# Patient Record
Sex: FEMALE | Race: OTHER | HISPANIC OR LATINO | Employment: UNEMPLOYED | URBAN - METROPOLITAN AREA
[De-identification: names, ages, dates, MRNs, and addresses within clinical notes are randomized per-mention and may not be internally consistent; named-entity substitution may affect disease eponyms.]

---

## 2022-06-21 LAB
EXTERNAL ABO GROUPING: NORMAL
EXTERNAL ANTIBODY SCREEN: NORMAL
EXTERNAL CHLAMYDIA RESULT: NOT DETECTED
EXTERNAL HEPATITIS B SURFACE ANTIGEN: NORMAL
EXTERNAL HIV SCREEN: NORMAL
EXTERNAL HIV-1/2 AB-AG: NORMAL
EXTERNAL RH FACTOR: POSITIVE
EXTERNAL RUBELLA IGG QUANTITATION: NORMAL
EXTERNAL SYPHILIS RPR SCREEN: NORMAL
HCV AB SER-ACNC: NOT DETECTED
N GONORRHOEA RRNA SPEC QL PROBE: NOT DETECTED

## 2022-07-29 ENCOUNTER — TELEPHONE (OUTPATIENT)
Dept: OBGYN CLINIC | Facility: CLINIC | Age: 22
End: 2022-07-29

## 2022-07-29 NOTE — TELEPHONE ENCOUNTER
Margie would like to transfer as an ob  She is about 15 wks  She will have her MR transferred to us  Informed pt that we will schedule her appt once we receive her MR  Transferring from a [facility]

## 2022-08-03 NOTE — TELEPHONE ENCOUNTER
Spoke to patient, she stated that she requested records last week and was told they would be sent  MR Release has been faxed to patient so that our office can speed up the process

## 2022-08-09 NOTE — TELEPHONE ENCOUNTER
All records from pervious OB/GYN rec'd and already scanned into patient's chart 08/09/22 around 11:30 am  Dimitrios aware  All prenatal labs completed and Sequential Screening and NT scan completed  Pt will need 20 wks anatomy scan order to [facility]

## 2022-08-10 ENCOUNTER — INITIAL PRENATAL (OUTPATIENT)
Dept: OBGYN CLINIC | Facility: CLINIC | Age: 22
End: 2022-08-10

## 2022-08-10 VITALS
HEIGHT: 67 IN | WEIGHT: 215.4 LBS | SYSTOLIC BLOOD PRESSURE: 112 MMHG | BODY MASS INDEX: 33.81 KG/M2 | DIASTOLIC BLOOD PRESSURE: 68 MMHG

## 2022-08-10 DIAGNOSIS — O99.211 OBESITY AFFECTING PREGNANCY IN FIRST TRIMESTER: Primary | ICD-10-CM

## 2022-08-10 DIAGNOSIS — Z86.19 HISTORY OF HERPES GENITALIS: ICD-10-CM

## 2022-08-10 DIAGNOSIS — Z3A.17 17 WEEKS GESTATION OF PREGNANCY: ICD-10-CM

## 2022-08-10 DIAGNOSIS — O23.41 URINARY TRACT INFECTION IN MOTHER DURING FIRST TRIMESTER OF PREGNANCY: ICD-10-CM

## 2022-08-10 DIAGNOSIS — F19.91 HISTORY OF DRUG USE: ICD-10-CM

## 2022-08-10 PROBLEM — Z87.898 HISTORY OF DRUG USE: Status: ACTIVE | Noted: 2022-08-10

## 2022-08-10 PROBLEM — O23.40 UTI (URINARY TRACT INFECTION) DURING PREGNANCY: Status: ACTIVE | Noted: 2022-08-10

## 2022-08-10 LAB
SL AMB  POCT GLUCOSE, UA: NEGATIVE
SL AMB POCT URINE PROTEIN: NORMAL

## 2022-08-10 RX ORDER — CEPHALEXIN 500 MG/1
1 CAPSULE ORAL 2 TIMES DAILY
COMMUNITY
Start: 2022-08-04 | End: 2022-10-06

## 2022-08-10 NOTE — PROGRESS NOTES
Routine Prenatal Visit  [address]   [address], [address], [facility], [facility]    Assessment/Plan:  Brenda Muñoz is a 23y o  year old  at 17w2d who presents for routine prenatal visit as a Transfer patient from [employer]     1  Obesity affecting pregnancy in first trimester  Assessment & Plan:  Early 1 Hr     Orders:  -     Ambulatory Referral to Maternal Fetal Medicine; Future; Expected date: 2022    2  17 weeks gestation of pregnancy  -     POCT urine dip  -     Ambulatory Referral to Maternal Fetal Medicine; Future; Expected date: 2022    3  Urinary tract infection in mother during first trimester of pregnancy  Assessment & Plan:  Treated with Keflex 2022 by [facility]     [ ] UA C&S after completing Abx      Orders:  -     Ambulatory Referral to Maternal Fetal Medicine; Future; Expected date: 2022  -     Urine culture; Future  -     Urine culture    4  History of drug use  Assessment & Plan:  UDS positive for Marijuana 2022    [  ]  UDS at time of birth    Orders:  -     Ambulatory Referral to Maternal Fetal Medicine; Future; Expected date: 2022    5  History of herpes genitalis  Assessment & Plan:  Previous outbreak 2018  Start Valtrex at 36 weeks    Orders:  -     Ambulatory Referral to Maternal Fetal Medicine; Future; Expected date: 2022        Next OB Visit 4 weeks  Subjective:     CC: Prenatal care    Margie Ortega is a 23 y o   female who presents for routine prenatal care at 17w2d as a Transfer patient form Ob/Gyn in [facility]    Pregnancy ROS: no leakage of fluid, pelvic pain, or vaginal bleeding  No fetal movement      The following portions of the patient's history were reviewed and updated as appropriate: allergies, current medications, past family history, past medical history, obstetric history, gynecologic history, past social history, past surgical history and problem list       Objective:  /68   Ht 5' 6 5" (1 689 m)   Wt 97 7 kg (215 lb 6 4 oz)   LMP  (LMP Unknown)   BMI 34 25 kg/m²   Pregravid Weight/BMI: Pregravid weight not on file (BMI Could not be calculated)  Current Weight: 97 7 kg (215 lb 6 4 oz)   Total Weight Gain: Not found  Pre-Akilah Vitals    Flowsheet Row Most Recent Value   Prenatal Assessment    Fetal Heart Rate 160   Fundal Height (cm) 17 cm   Movement Absent   Prenatal Vitals    Blood Pressure 112/68   Weight - Scale 97 7 kg (215 lb 6 4 oz)   Urine Albumin/Glucose    Dilation/Effacement/Station    Vaginal Drainage    Draining Fluid No   Edema            General: Well appearing, no distress  Abdomen: Soft, gravid, nontender  Fundal Height: Appropriate for gestational age  Extremities: Warm and well perfused  Non tender

## 2022-08-29 ENCOUNTER — TELEPHONE (OUTPATIENT)
Dept: OBGYN CLINIC | Facility: CLINIC | Age: 22
End: 2022-08-29

## 2022-08-29 NOTE — TELEPHONE ENCOUNTER
Margie left a message Saturday 08/27/22,stating her boyfriend tested COVID (+)  She wanted to inform office  Left message on Margie's voice mail to avoid contact with boyfriend, may test her self today for COVID and call back with questions

## 2022-08-31 ENCOUNTER — TELEPHONE (OUTPATIENT)
Dept: OBGYN CLINIC | Facility: CLINIC | Age: 22
End: 2022-08-31

## 2022-08-31 NOTE — TELEPHONE ENCOUNTER
Spoke with Margie she called and left a message on the nurses voicemail regarding developing symptoms of covid after her boyfriend tested positive  Margie called in on Monday to tell us her boyfriend was positive but her test was negative  Today she started with a sore throat, cough, and ear congestion and pain  She has an apt at a [facility] near her house today  She was wondering what medications she can take  Advised her it is safe to take Acetaminophen/tyelnol as needed; Guaifenesin (plain robitussin or mucinex), Allergy medication (zyrtec, claritin, allegra), flonase, and gargle warm salt water as well  Also please contact our office if your test comes back positive

## 2022-09-06 ENCOUNTER — TELEPHONE (OUTPATIENT)
Dept: OBGYN CLINIC | Facility: CLINIC | Age: 22
End: 2022-09-06

## 2022-09-06 NOTE — TELEPHONE ENCOUNTER
Patient l/m on 09/02/2022 at 6:55pm stating she forgot to mention that she was (+) covid about 1-2 days ago  She is almost 22 wks pregnant  Pt have an appointment scheduled for 09/07/22  Will call to check if pt still symptomatic or not to determine if need to r/s her OB appointment

## 2022-09-08 PROBLEM — O99.212 OBESITY AFFECTING PREGNANCY IN SECOND TRIMESTER: Status: ACTIVE | Noted: 2022-08-10

## 2022-09-09 ENCOUNTER — TELEPHONE (OUTPATIENT)
Dept: PERINATAL CARE | Facility: CLINIC | Age: 22
End: 2022-09-09

## 2022-09-09 PROBLEM — Z3A.21 21 WEEKS GESTATION OF PREGNANCY: Status: ACTIVE | Noted: 2022-09-09

## 2022-09-09 PROBLEM — O98.512 COVID-19 AFFECTING PREGNANCY IN SECOND TRIMESTER: Status: ACTIVE | Noted: 2022-09-09

## 2022-09-09 PROBLEM — U07.1 COVID-19 AFFECTING PREGNANCY IN SECOND TRIMESTER: Status: ACTIVE | Noted: 2022-09-09

## 2022-09-09 NOTE — TELEPHONE ENCOUNTER
Called patient to reschedule detailed ultrasound  Left voicemail request patient to call back to schedule at [phone]

## 2022-09-14 ENCOUNTER — TELEPHONE (OUTPATIENT)
Dept: PERINATAL CARE | Facility: OTHER | Age: 22
End: 2022-09-14

## 2022-09-14 NOTE — TELEPHONE ENCOUNTER
Pt called to chavaule her level 2 , she is 22w edc 1/16 , I did tell pt that I will need to put her on our schelduling needs and we will get back to her within 1-2 days, pt was fine with that

## 2022-09-26 ENCOUNTER — TELEPHONE (OUTPATIENT)
Dept: OBGYN CLINIC | Facility: CLINIC | Age: 22
End: 2022-09-26

## 2022-09-26 NOTE — TELEPHONE ENCOUNTER
Patient called stating that she is 24 wks pregnant  She noticed that she having a anal prolapse that is bothersome and wanted to know if she can do something about it or if it problematic post partum or during delivery  She states it's itchy, irritated and sometime difficult to have a bowel movement  She states certain days it goes back up and other days when she baring down, she notice the prolapse  Advised her that what she is dealing with is call Hemorrhoid which is common in pregnancy due to the pressure  She said she remember she had hemorrhoid before a long time ago and it does feel like that but this time seem slightly bigger  Advised her that she can use OTC witch hazel wipes, prep h cream, take colace or miralax, and continue to monitor  If it becomes very painful or notice that the hemorrhoid is thrombosed then she should be evaluated further at a GI specialist  Otherwise advised her she also may experience hemorrhoid post partum due to the nature of the pressure of pushing and labor but will not affect anything postpartum  She verbalized understanding with no further questions

## 2022-09-29 ENCOUNTER — TELEPHONE (OUTPATIENT)
Dept: OBGYN CLINIC | Facility: CLINIC | Age: 22
End: 2022-09-29

## 2022-09-29 NOTE — TELEPHONE ENCOUNTER
Received fax request from [facility] [address] for breast pump  Form completed and faxed to ainsley  Faxed to [facility] to scan to chart

## 2022-10-03 ENCOUNTER — ROUTINE PRENATAL (OUTPATIENT)
Dept: PERINATAL CARE | Facility: OTHER | Age: 22
End: 2022-10-03
Payer: COMMERCIAL

## 2022-10-03 VITALS
SYSTOLIC BLOOD PRESSURE: 122 MMHG | HEART RATE: 103 BPM | BODY MASS INDEX: 33.92 KG/M2 | DIASTOLIC BLOOD PRESSURE: 76 MMHG | HEIGHT: 68 IN | WEIGHT: 223.8 LBS

## 2022-10-03 DIAGNOSIS — Z36.3 ENCOUNTER FOR ANTENATAL SCREENING FOR MALFORMATION: Primary | ICD-10-CM

## 2022-10-03 DIAGNOSIS — O23.41 URINARY TRACT INFECTION IN MOTHER DURING FIRST TRIMESTER OF PREGNANCY: ICD-10-CM

## 2022-10-03 DIAGNOSIS — O99.212 OBESITY AFFECTING PREGNANCY IN SECOND TRIMESTER: ICD-10-CM

## 2022-10-03 DIAGNOSIS — Z3A.25 25 WEEKS GESTATION OF PREGNANCY: ICD-10-CM

## 2022-10-03 DIAGNOSIS — F19.91 HISTORY OF DRUG USE: ICD-10-CM

## 2022-10-03 DIAGNOSIS — Z86.19 HISTORY OF HERPES GENITALIS: ICD-10-CM

## 2022-10-03 PROCEDURE — 76811 OB US DETAILED SNGL FETUS: CPT | Performed by: STUDENT IN AN ORGANIZED HEALTH CARE EDUCATION/TRAINING PROGRAM

## 2022-10-03 PROCEDURE — 99242 OFF/OP CONSLTJ NEW/EST SF 20: CPT | Performed by: STUDENT IN AN ORGANIZED HEALTH CARE EDUCATION/TRAINING PROGRAM

## 2022-10-03 RX ORDER — ASPIRIN 81 MG/1
162 TABLET ORAL DAILY
Qty: 60 TABLET | Refills: 4 | Status: SHIPPED | OUTPATIENT
Start: 2022-10-03 | End: 2022-11-02

## 2022-10-03 NOTE — PROGRESS NOTES
[facility]: Ms Senait Pichardo was seen today for anatomic survey ultrasound  See ultrasound report under "OB Procedures" tab  Review of Systems   Constitutional: Negative for chills, fever and unexpected weight change  HENT: Negative for congestion, dental problem, facial swelling and sore throat  Eyes: Negative for visual disturbance  Respiratory: Negative for cough and shortness of breath  Cardiovascular: Negative for chest pain and palpitations  Gastrointestinal: Negative for diarrhea and vomiting  Endocrine: Negative for polydipsia  Genitourinary: Negative for dysuria and vaginal bleeding  Musculoskeletal: Negative for back pain and joint swelling  Skin: Negative for rash and wound  Allergic/Immunologic: Negative for immunocompromised state  Neurological: Negative for seizures and headaches  Hematological: Does not bruise/bleed easily  Psychiatric/Behavioral: Negative for dysphoric mood, hallucinations and suicidal ideas  Physical Exam  Constitutional:       General: She is not in acute distress  Appearance: Normal appearance  She is obese  HENT:      Head: Normocephalic and atraumatic  Eyes:      Extraocular Movements: Extraocular movements intact  Cardiovascular:      Rate and Rhythm: Normal rate  Pulmonary:      Effort: Pulmonary effort is normal  No respiratory distress  Skin:     Findings: No erythema or rash  Neurological:      Mental Status: She is alert and oriented to person, place, and time  Psychiatric:         Mood and Affect: Mood normal          Behavior: Behavior normal          Please don't hesitate to contact our office with any concerns or questions    -Yvonne San

## 2022-10-03 NOTE — LETTER
October 3, 2022     Jeff Mario, [address] [address] [place] [address]  [place]    Patient: Silver Crain   YOB: 2000   Date of Visit: 10/3/2022       Dear Dr Damon Somers: Thank you for referring Albania Pedro to me for evaluation  Below are my notes for this consultation  If you have questions, please do not hesitate to call me  I look forward to following your patient along with you  Sincerely,        Krystyna Ritchie MD        CC: No Recipients  Krystyna Ritchie MD  10/3/2022 11:44 AM  Sign when Signing Visit  [facility]: Ms Anai Hilario was seen today for anatomic survey ultrasound  See ultrasound report under "OB Procedures" tab  Review of Systems   Constitutional: Negative for chills, fever and unexpected weight change  HENT: Negative for congestion, dental problem, facial swelling and sore throat  Eyes: Negative for visual disturbance  Respiratory: Negative for cough and shortness of breath  Cardiovascular: Negative for chest pain and palpitations  Gastrointestinal: Negative for diarrhea and vomiting  Endocrine: Negative for polydipsia  Genitourinary: Negative for dysuria and vaginal bleeding  Musculoskeletal: Negative for back pain and joint swelling  Skin: Negative for rash and wound  Allergic/Immunologic: Negative for immunocompromised state  Neurological: Negative for seizures and headaches  Hematological: Does not bruise/bleed easily  Psychiatric/Behavioral: Negative for dysphoric mood, hallucinations and suicidal ideas  Physical Exam  Constitutional:       General: She is not in acute distress  Appearance: Normal appearance  She is obese  HENT:      Head: Normocephalic and atraumatic  Eyes:      Extraocular Movements: Extraocular movements intact  Cardiovascular:      Rate and Rhythm: Normal rate  Pulmonary:      Effort: Pulmonary effort is normal  No respiratory distress  Skin:     Findings: No erythema or rash  Neurological:      Mental Status: She is alert and oriented to person, place, and time  Psychiatric:         Mood and Affect: Mood normal          Behavior: Behavior normal          Please don't hesitate to contact our office with any concerns or questions    Balaji Munroe

## 2022-10-05 ENCOUNTER — TELEPHONE (OUTPATIENT)
Dept: OBGYN CLINIC | Facility: CLINIC | Age: 22
End: 2022-10-05

## 2022-10-05 NOTE — TELEPHONE ENCOUNTER
Pt called into the nurses line last evening and left a message regarding her ultrasound  The ultrasound tech was pressing hard on her abdomen and it feels bruised  She is wondering if this is normal     Called the pt back and left a voicemail to return our call

## 2022-10-05 NOTE — TELEPHONE ENCOUNTER
Return call from [facility], +FM  advised not uncommon to feel slight soreness  Should improve over next 24 hours   C/B if has increasing discomfort

## 2022-10-06 ENCOUNTER — ROUTINE PRENATAL (OUTPATIENT)
Dept: OBGYN CLINIC | Facility: CLINIC | Age: 22
End: 2022-10-06

## 2022-10-06 VITALS
WEIGHT: 224.2 LBS | SYSTOLIC BLOOD PRESSURE: 105 MMHG | BODY MASS INDEX: 33.98 KG/M2 | HEIGHT: 68 IN | DIASTOLIC BLOOD PRESSURE: 75 MMHG

## 2022-10-06 DIAGNOSIS — Z3A.25 25 WEEKS GESTATION OF PREGNANCY: Primary | ICD-10-CM

## 2022-10-06 DIAGNOSIS — Z36.89 ENCOUNTER FOR OTHER SPECIFIED ANTENATAL SCREENING: ICD-10-CM

## 2022-10-06 PROBLEM — U07.1 COVID-19 VIRUS INFECTION: Status: ACTIVE | Noted: 2021-01-02

## 2022-10-06 PROBLEM — IMO0002 BMI (BODY MASS INDEX), PEDIATRIC, GREATER THAN OR EQUAL TO 95% FOR AGE: Status: ACTIVE | Noted: 2017-10-30

## 2022-10-06 PROBLEM — Z3A.21 21 WEEKS GESTATION OF PREGNANCY: Status: RESOLVED | Noted: 2022-09-09 | Resolved: 2022-10-06

## 2022-10-06 LAB
SL AMB  POCT GLUCOSE, UA: NEGATIVE
SL AMB POCT URINE PROTEIN: NEGATIVE

## 2022-10-06 NOTE — ASSESSMENT & PLAN NOTE
Has follow up with MFM for growth and missed anatomy  28 wk labs given including 1hr GTT, CBC, RPR  Continue routine prenatal care  Return to office in 3 weeks for ob check

## 2022-10-06 NOTE — PROGRESS NOTES
[facility]    Assessment/Plan:  Kwaku Tapia is a 23y o  year old  at 25w3d who presents for routine prenatal visit  1  25 weeks gestation of pregnancy  Assessment & Plan:  Has follow up with MFM for growth and missed anatomy  28 wk labs given including 1hr GTT, CBC, RPR  Continue routine prenatal care  Return to office in 3 weeks for ob check  Orders:  -     POCT urine dip    2  Encounter for other specified  screening  -     Glucose, 1H PG; Future  -     CBC; Future  -     RPR; Future  -     Glucose, 1H PG  -     CBC  -     RPR      Next OB Visit 4 weeks  Subjective:     CC: Prenatal care    Margie Moore is a 23 y o   female who presents for routine prenatal care at 25w3d  Pregnancy ROS: Good FM, some soreness after the ultrasound, has improved  No N/V, HA, cramping, VB, LOF, edema, domestic violence, or smoking  Tolerating PNV  Last used Marijuana 2022  The following portions of the patient's history were reviewed and updated as appropriate: allergies, current medications, past family history, past medical history, obstetric history, gynecologic history, past social history, past surgical history and problem list       Objective:  /75 (BP Location: Left arm, Patient Position: Sitting, Cuff Size: Standard)   Ht 5' 7 5" (1 715 m)   Wt 102 kg (224 lb 3 2 oz)   LMP  (LMP Unknown)   BMI 34 60 kg/m²   Pregravid Weight/BMI: Pregravid weight not on file (BMI Could not be calculated)  Current Weight: 102 kg (224 lb 3 2 oz)   Total Weight Gain: Not found     Pre-Akilah Vitals    Flowsheet Row Most Recent Value   Prenatal Assessment    Fetal Heart Rate 155   Fundal Height (cm) 25 cm   Movement Present   Prenatal Vitals    Blood Pressure 105/75   Weight - Scale 102 kg (224 lb 3 2 oz)   Urine Albumin/Glucose    Dilation/Effacement/Station    Vaginal Drainage    Draining Fluid No   Edema    LLE Edema None   RLE Edema None   Facial Edema None           General: Well appearing, no distress  Abdomen: Soft, gravid, nontender  Fundal Height: Appropriate for gestational age  Extremities: Warm and well perfused  Non tender

## 2022-10-11 PROBLEM — O23.40 UTI (URINARY TRACT INFECTION) DURING PREGNANCY: Status: RESOLVED | Noted: 2022-08-10 | Resolved: 2022-10-11

## 2022-10-24 ENCOUNTER — ROUTINE PRENATAL (OUTPATIENT)
Dept: OBGYN CLINIC | Facility: CLINIC | Age: 22
End: 2022-10-24

## 2022-10-24 VITALS
SYSTOLIC BLOOD PRESSURE: 120 MMHG | BODY MASS INDEX: 34.71 KG/M2 | DIASTOLIC BLOOD PRESSURE: 80 MMHG | HEIGHT: 68 IN | WEIGHT: 229 LBS

## 2022-10-24 DIAGNOSIS — Z3A.28 28 WEEKS GESTATION OF PREGNANCY: ICD-10-CM

## 2022-10-24 DIAGNOSIS — F19.91 HISTORY OF DRUG USE: ICD-10-CM

## 2022-10-24 DIAGNOSIS — Z86.19 HISTORY OF HERPES GENITALIS: ICD-10-CM

## 2022-10-24 LAB
SL AMB  POCT GLUCOSE, UA: NORMAL
SL AMB POCT URINE PROTEIN: NORMAL

## 2022-10-24 NOTE — PROGRESS NOTES
Routine Prenatal Visit  [address] [facility] [address] [facility], [address], [facility], [address] [facility]    Assessment/Plan:  Rock Peterson is a 23y o  year old  at 31w0d who presents for routine prenatal visit  1  History of drug use    2  History of herpes genitalis    3  BMI (body mass index), pediatric, greater than or equal to 95% for age    3  28 weeks gestation of pregnancy  -     POCT urine dip    Dw pt wt gain  And exercise  Hydration  Reviewed       Subjective:     CC: Prenatal care    Margie Anne is a 23 y o   female who presents for routine prenatal care at 28w0d  Pregnancy ROS: no  leakage of fluid, pelvic pain, or vaginal bleeding   +  fetal movement  The following portions of the patient's history were reviewed and updated as appropriate: allergies, current medications, past family history, past medical history, obstetric history, gynecologic history, past social history, past surgical history and problem list       Objective:  Ht 5' 7 5" (1 715 m)   Wt 104 kg (229 lb)   LMP  (LMP Unknown)   BMI 35 34 kg/m²   Pregravid Weight/BMI: Pregravid weight not on file (BMI Could not be calculated)  Current Weight: 104 kg (229 lb)   Total Weight Gain: Not found  Pre- Vitals    Flowsheet Row Most Recent Value   Prenatal Assessment    Prenatal Vitals    Weight - Scale 104 kg (229 lb)   Urine Albumin/Glucose    Dilation/Effacement/Station    Vaginal Drainage    Edema            General: Well appearing, no distress  Respiratory: Unlabored breathing  Cardiovascular: Regular rate  Abdomen: Soft, gravid, nontender  Fundal Height: Appropriate for gestational age  Extremities: Warm and well perfused  Non tender

## 2022-10-24 NOTE — PATIENT INSTRUCTIONS
The Third Trimester  (28-42 weeks)  YOUR BABY   * your baby sucks its thumb now! * your baby can hear voices and respond to touch…   so talk to him or her!!   * your baby’s brain grows and develops most in the last 2 months of pregnancy   * baby’s head and bones are soft and flexible so they can fit through the birth canal   * baby’s movements change towards the end of pregnancy because there is less room for kicking and stretching in your belly   * baby’s lungs are not fully developed and completely ready to breathe on their own until the last 3-4 weeks before your due date    YOUR BODY   * your belly is growing a lot now   * it may become more difficult to sleep well at night or to be as active as you usually are   * you may sweat more than usual   * you will become more off-balance……be careful not to fall!!   * you may develop hemorrhoids (which can be painful and make it difficult to sit down)   * the last two months of pregnancy can become very uncomfortable……with backaches, headaches, and heartburn   * you can start to have contractions……  as long as they are irregular and less than 5 per hour, this is a normal part of your body getting ready to have a baby   * your cervix may start to thin out and open up……to get ready for delivery   * you may find yourself needing to “pee” very often……  because baby is pressing on your bladder so much   * you may get out of breathe more quickly than usual      FETAL KICK COUNTS    In the third trimester (after 28 weeks gestation) you should be performing fetal kick counts every day  Your baby should move at least 10 times in 2 hours during an active time, once a day  Choose atime of day when your baby is most active  Try to do this around the same time each day  Get into a comfortable position and then write down the time your baby first moves  Count each movement until the baby moves 10 times  These movements include kicks, punches, nudges, flutters, or rolls    This can take anywhere from 5 minutes to 2 hours  Write down the time you feel the baby's 10th movement  If 2 hours has passed and your baby has not moved at least 10 times, you should CALL THE OFFICE RIGHT AWAY  [phone]          PREMATURE LABOR     When to call [phone]:  * I need to call immediately if I have even a small amount of LIQUID leaking from my vagina, with or without contractions  * I need to call if I am BLEEDING from my vagina  * I need to call if I am feeling CRAMPING that continues after drinking 2-3 glasses of water and lying down on my side for one hour and that feels like I am having a period  * I need to call if I feel CONTRACTIONS  more than 4 times in an hour that feels like the baby is “balling up” even after I try drinking 2-3 glasses of water and lying down on my side for an hour  * I need to call if I notice a change in my vaginal DISCHARGE  * I need to call if I am feeling PELVIC PRESSURE  that feels like the baby is pushing down into my vagina and lasts more than an hour  * I need to call if I have LOW BACKACHE which is new and near my tailbone  It may either come and go several times during an hour or stay there constantly  PRE-ECLAMPSIA     What is it? Pre-eclampsia is a serious disease that can occur during pregnancy related to high blood pressures  It can happen to any woman  Why should I care? Women who develop pre-eclampsia have serious risks which can include seizures, stroke, organ damage, premature birth of their baby  In the very worst cases, it can cause death of the mother and/or their baby  What should I pay attention to?    Signs and symptoms of pre-eclampsia can include:   * Severe swelling of face or hands    * A headache that will not go away even after you have taken Tylenol   * Seeing spots or changes in eyesight    * Pain in the upper abdomen or shoulder    * New nausea and vomiting (in the second half of pregnancy)    * Sudden weight gain    * Difficulty breathing     What should I do? If you experience any of the above symptoms of pre-eclampsia, contact your OB provider  Finding pre-eclampsia early is important for you and your baby  Call us       BREASTFEEDING     BENEFITS FOR BABIES   * stronger immune systems (less allergies, eczema, asthma, and childhood cancers)   * less diarrhea and constipation or other GI diseases   * fewer colds and ear infections   * better vision and teeth (fewer cavities)   * improves IQ   * lower rates of diabetes and obesity in childhood     BENEFITS FOR MOMS   * promotes faster weight loss after delivery   * lower risk for postpartum depression   * lower risk for breast, uterine, and ovarian cancers   * lower risk for osteoporosis developing with age   * easier than formula - is always right with you, clean, and the right temperature   * less expensive than formula……it’s FREE !!!!     KEYS TO SUCCESSFUL BREASTFEEDING   * keep baby skin-to-skin until after first feeding event   * keep baby in your room with you during your hospital stay after delivery   * avoid any bottle feedings (unless medically necessary)   * limit the use of pacifiers and swaddling   * ask for help if you are having any issues……lactation consultants (who specialize in breastfeeding) are available to help you   * a healthy diet for mom……eating a variety of foods and portions in moderation    THINGS YOU SHOULD KNOW ABOUT BREASTFEEDING   * most medications are considered compatible with breastfeeding by the [address] [place] [address] [employer] of Pediatrics, but you should check with your health care provider or lactation consultant prior to taking a new medication……just to be sure it is safe   * alcohol (beer, wine, liquor) can be passed from mother to baby through breast milk……an occasional, social drink is deemed acceptable by the American Academy of [facility]   more than that should be avoided   * breastfeeding is NOT an effective method of birth control   * nicotine (in cigarettes) can pass from mother to baby through breast milk…   however, for mothers who smoke, it is still healthier to breastfeed than use formula   * caffeine should be limited to 1-3 cups per day……includes coffee, soda, energy drinks         PERINEAL / VAGINAL MASSAGE    What can I do now to decrease my chances of tearing during delivery? Massaging around the vaginal opening by you (or your partner), either antepartum (before birth) or during the second stage of labor, can reduce the likelihood of perineal tearing during childbirth  Likewise, the use of warm packs held on the perineum during the pushing stage of labor can reduce the severity of your tear  This will happen during the pushing stage of labor  At home, you can also help reduce the chances of injury that may occur during the birth of your child through perineal massage  When should I do this? Starting around or shortly after 34 weeks of pregnancy, you or your partner should provide 5-10 minutes of vaginal massage 1-4 times per week  How? Use either almond, coconut, or olive oil and water mixture on 1 or 2 fingers (depending on comfort)  Insert finger(s) 3-5cm into the vagina  Apply sweeping downward/sideward pressure from 3 to 9 o'clock for 5-10 minutes, 1-4 times per week  WARNING SIGNS DURING PREGNANCY  Call our office at [phone] if you experience any of the followin  Vaginal bleeding  2  Sharp abdominal pain that does not go away  3  Fever (more than 100 4 and is not relieved by Tylenol)  4  Persistent vomiting lasting greater than 24 hours  5  Chest pain   6  Pain or burning when you urinate  7  Severe headache that doesn't resolve with Tylenol  8  Blurred vision or seeing spots in your vision  9  Sudden swelling of your face or hands  10  Redness, swelling or pain in a leg  11  A sudden weight gain in just a few days  12   Decrease in your baby's movement (after 28 weeks or the 6th month of pregnancy)  13  A loss of watery fluid from your vagina - can be a gush, a trickle or continuous wetness  14  After 20 weeks of pregnancy, rhythmic cramping (greater than 4 per hour) or menstrual like low/pelvic pain          VACCINES IN PREGNANCY    TDAP  Whopping cough (or pertusSsis) can be serious for anyone, but for your , it can be life-threatning  Up to 20 babies die each year in the [place]  Due to whopping cough  About half of babies younger than 3year old who get whopping cough need treatment in the hospital   The younger the baby is when he or she gets whopping cough, the more likely he or she will need to be treated in a hospital   When you receive the whopping cough vaccine (Tdap) during your pregnancy, your body will create protective antibodies and pass some of them to your baby before birth  These antibodies can help protect your baby from getting whopping cough until they are old enough to be vaccinated themselves (usually around 7 months of age)  INFLUENZA  Changes in your immune, heart, and lung functions during pregnancy make you more likely to get seriously ill from the flu  Catching the flu also increases your chances for serious problems for your developing baby, including premature labor and delivery  It is recommended that all women who are pregnant during flu season should receive an influenza vaccine

## 2022-11-01 LAB
ERYTHROCYTE [DISTWIDTH] IN BLOOD BY AUTOMATED COUNT: 13.1 % (ref 11.7–15.4)
GLUCOSE 1H P 50 G GLC PO SERPL-MCNC: 101 MG/DL (ref 70–139)
HCT VFR BLD AUTO: 34.8 % (ref 34–46.6)
HGB BLD-MCNC: 11.5 G/DL (ref 11.1–15.9)
MCH RBC QN AUTO: 28.3 PG (ref 26.6–33)
MCHC RBC AUTO-ENTMCNC: 33 G/DL (ref 31.5–35.7)
MCV RBC AUTO: 86 FL (ref 79–97)
PLATELET # BLD AUTO: 227 X10E3/UL (ref 150–450)
RBC # BLD AUTO: 4.07 X10E6/UL (ref 3.77–5.28)
RPR SER QL: NON REACTIVE
WBC # BLD AUTO: 8.2 X10E3/UL (ref 3.4–10.8)

## 2022-11-04 ENCOUNTER — TELEPHONE (OUTPATIENT)
Dept: OBGYN CLINIC | Facility: CLINIC | Age: 22
End: 2022-11-04

## 2022-11-04 NOTE — TELEPHONE ENCOUNTER
Called and left v/m for patient, no additional recommendations, if no improvement after a few days call office to check in

## 2022-11-04 NOTE — TELEPHONE ENCOUNTER
Patient 29w4d, called into nurse's line reporting what she thinks is bladder pain and back pain when she ambulates that started yesterday, she states the bigger her strides the more painful it is  She denies urgency pain or burning with urination, denies abnormal discharge, Encouraged hydrating with a tleast gallon of water daily, tylenol and belly band and rest when able  Dr Belen Holt,   Please review and advise if any additional recommendations

## 2022-11-08 ENCOUNTER — ROUTINE PRENATAL (OUTPATIENT)
Dept: OBGYN CLINIC | Facility: CLINIC | Age: 22
End: 2022-11-08

## 2022-11-08 VITALS
SYSTOLIC BLOOD PRESSURE: 114 MMHG | BODY MASS INDEX: 34.56 KG/M2 | DIASTOLIC BLOOD PRESSURE: 82 MMHG | HEIGHT: 68 IN | WEIGHT: 228 LBS

## 2022-11-08 DIAGNOSIS — R10.2 PELVIC PAIN AFFECTING PREGNANCY IN THIRD TRIMESTER, ANTEPARTUM: ICD-10-CM

## 2022-11-08 DIAGNOSIS — O26.893 PELVIC PAIN AFFECTING PREGNANCY IN THIRD TRIMESTER, ANTEPARTUM: ICD-10-CM

## 2022-11-08 DIAGNOSIS — Z3A.30 30 WEEKS GESTATION OF PREGNANCY: Primary | ICD-10-CM

## 2022-11-08 PROBLEM — Z3A.25 25 WEEKS GESTATION OF PREGNANCY: Status: RESOLVED | Noted: 2022-10-06 | Resolved: 2022-11-08

## 2022-11-08 LAB
SL AMB  POCT GLUCOSE, UA: NEGATIVE
SL AMB LEUKOCYTE ESTERASE,UA: NEGATIVE
SL AMB POCT BILIRUBIN,UA: NEGATIVE
SL AMB POCT BLOOD,UA: NEGATIVE
SL AMB POCT KETONES,UA: NEGATIVE
SL AMB POCT NITRITE,UA: NEGATIVE
SL AMB POCT PH,UA: 6.5
SL AMB POCT SPECIFIC GRAVITY,UA: 1.02
SL AMB POCT URINE PROTEIN: NEGATIVE
SL AMB POCT UROBILINOGEN: 0.2

## 2022-11-08 NOTE — ASSESSMENT & PLAN NOTE
Urine dip negative today, will send for culture with patient's symptoms  Aware to call if develop other UTI symptoms  Continue belly band  Reviewed option of pelvic floor physical therapy to help with discomfort  Referral given  Pediatrician referral entered  28 wk packet given  Patient ordered breast pump already, reports cannot get until delivered with her insurance  Will plan Tdap the next time she is in [place]  Return to office for ob check or as needed

## 2022-11-08 NOTE — PROGRESS NOTES
Routine Prenatal Visit  [facility] - Yoli Zeng    Assessment/Plan:  Baron Macias is a 23y o  year old  at 30w1d who presents for routine prenatal visit  1  30 weeks gestation of pregnancy  Assessment & Plan:  Urine dip negative today, will send for culture with patient's symptoms  Aware to call if develop other UTI symptoms  Continue belly band  Reviewed option of pelvic floor physical therapy to help with discomfort  Referral given  Pediatrician referral entered  28 wk packet given  Patient ordered breast pump already, reports cannot get until delivered with her insurance  Will plan T[facility]ap the [facility] time [facility]  Return to office for ob check or as needed  Orders:  -     POCT urine dip  -     Ambulatory Referral to Pediatrics; Future    2  Pelvic pain affecting pregnancy in third trimester, antepartum  -     Ambulatory Referral to Physical Therapy; Future  -     Urine culture      Next OB Visit 2 weeks  Subjective:     CC: Prenatal care    Margie Barry is a 23 y o   female who presents for routine prenatal care at 30w1d  Pregnancy ROS: Good FM, Reports having pelvic discomfort, feels like baby is sitting on bladder  Urinary frequency, no hesitancy, dysuria, fever, chills, flank pain, or hematuria  Tried belly band with minimal support  Worse when she is at work  Denies vaginal discharge, odors, itching  No N/V, HA, VB, LOF, edema, domestic violence, or smoking  Tolerating PNV          The following portions of the patient's history were reviewed and updated as appropriate: allergies, current medications, past family history, past medical history, obstetric history, gynecologic history, past social history, past surgical history and problem list       Objective:  /82 (BP Location: Left arm, Patient Position: Sitting, Cuff Size: Standard)   Ht 5' 7 5" (1 715 m)   Wt 103 kg (228 lb)   LMP  (LMP Unknown)   BMI 35 18 kg/m²   Pregravid Weight/BMI: Pregravid weight not on file (BMI Could not be calculated)  Current Weight: 103 kg (228 lb)   Total Weight Gain: Not found  Pre- Vitals    Flowsheet Row Most Recent Value   Prenatal Assessment    Fetal Heart Rate 150   Fundal Height (cm) 30 cm   Movement Present   Prenatal Vitals    Blood Pressure 114/82   Weight - Scale 103 kg (228 lb)   Urine Albumin/Glucose    Dilation/Effacement/Station    Vaginal Drainage    Draining Fluid No   Edema    LLE Edema None   RLE Edema None   Facial Edema None           General: Well appearing, no distress  Abdomen: Soft, gravid, nontender  Fundal Height: Appropriate for gestational age  Extremities: Warm and well perfused  Non tender

## 2022-11-10 LAB
BACTERIA UR CULT: NORMAL
Lab: NO GROWTH

## 2022-11-18 ENCOUNTER — ULTRASOUND (OUTPATIENT)
Dept: PERINATAL CARE | Facility: OTHER | Age: 22
End: 2022-11-18

## 2022-11-18 VITALS
BODY MASS INDEX: 33.95 KG/M2 | WEIGHT: 224 LBS | DIASTOLIC BLOOD PRESSURE: 80 MMHG | SYSTOLIC BLOOD PRESSURE: 118 MMHG | HEIGHT: 68 IN | HEART RATE: 105 BPM

## 2022-11-18 DIAGNOSIS — O99.213 MATERNAL OBESITY, ANTEPARTUM, THIRD TRIMESTER: ICD-10-CM

## 2022-11-18 DIAGNOSIS — Z36.4 ULTRASOUND FOR ANTENATAL SCREENING FOR FETAL GROWTH RESTRICTION: Primary | ICD-10-CM

## 2022-11-18 DIAGNOSIS — Z3A.31 31 WEEKS GESTATION OF PREGNANCY: ICD-10-CM

## 2022-11-18 NOTE — PATIENT INSTRUCTIONS
Kick Counts in Pregnancy   WHAT YOU NEED TO KNOW:   Kick counts measure how much your baby is moving in your womb  A kick from your baby can be felt as a twist, turn, swish, roll, or jab  It is common to feel your baby kicking at 26 to 28 weeks of pregnancy  You may feel your baby kick as early as 20 weeks of pregnancy  You may want to start counting at 28 weeks  DISCHARGE INSTRUCTIONS:   Contact your doctor immediately if:   You feel a change in the number of kicks or movements of your baby  You feel fewer than 10 kicks within 2 hours  You have questions or concerns about your baby's movements  Why measure kick counts:  Your baby's movement may provide information about your baby's health  He or she may move less, or not at all, if there are problems  Your baby may move less if he or she is not getting enough oxygen or nutrition from the placenta  Do not smoke while you are pregnant  Smoking decreases the amount of oxygen that gets to your baby  Talk to your healthcare provider if you need help to quit smoking  Tell your healthcare provider as soon as you feel a change in your baby's movements  When to measure kick counts:   Measure kick counts at the same time every day  Measure kick counts when your baby is awake and most active  Your baby may be most active in the evening  How to measure kick counts:  Check that your baby is awake before you measure kick counts  You can wake up your baby by lightly pushing on your belly, walking, or drinking something cold  Your healthcare provider may tell you different ways to measure kick counts  You may be told to do the following:  Use a chart or clock to keep track of the time you start and finish counting  Sit in a chair or lie on your left side  Place your hands on the largest part of your belly  Count until you reach 10 kicks  Write down how much time it takes to count 10 kicks  It may take 30 minutes to 2 hours to count 10 kicks  It should not take more than 2 hours to count 10 kicks  Follow up with your doctor as directed:  Write down your questions so you remember to ask them during your visits  © Copyright [employer] 2022 Information is for End User's use only and may not be sold, redistributed or otherwise used for commercial purposes  All illustrations and images included in CareNotes® are the copyrighted property of A D A M , Inc  or [facility] Shay Traore   The above information is an  only  It is not intended as medical advice for individual conditions or treatments  Talk to your doctor, nurse or pharmacist before following any medical regimen to see if it is safe and effective for you

## 2022-11-18 NOTE — LETTER
November 19, 2022     Andre Barnes, [address] [address],[address]  [place]    Patient: Joseph Seo   YOB: 2000   Date of Visit: 11/18/2022       Dear Dr Pressley Ards: Thank you for referring Castro Lora to me for evaluation  Below are my notes for this consultation  If you have questions, please do not hesitate to call me  I look forward to following your patient along with you  Sincerely,        Yang Johnson MD        CC: No Recipients  Yang Johnson MD  11/17/2022  7:17 PM  Sign when Signing Visit  Please refer to the [facility] ultrasound report in Ob Procedures for additional information regarding today's visit

## 2022-11-21 ENCOUNTER — ROUTINE PRENATAL (OUTPATIENT)
Dept: OBGYN CLINIC | Facility: CLINIC | Age: 22
End: 2022-11-21

## 2022-11-21 VITALS
SYSTOLIC BLOOD PRESSURE: 101 MMHG | BODY MASS INDEX: 34.01 KG/M2 | HEIGHT: 68 IN | WEIGHT: 224.4 LBS | DIASTOLIC BLOOD PRESSURE: 68 MMHG

## 2022-11-21 DIAGNOSIS — U07.1 COVID-19 VIRUS INFECTION: ICD-10-CM

## 2022-11-21 DIAGNOSIS — Z3A.32 32 WEEKS GESTATION OF PREGNANCY: Primary | ICD-10-CM

## 2022-11-21 DIAGNOSIS — F19.91 HISTORY OF DRUG USE: ICD-10-CM

## 2022-11-21 DIAGNOSIS — Z86.19 HISTORY OF HERPES GENITALIS: ICD-10-CM

## 2022-11-21 DIAGNOSIS — Z23 NEED FOR DIPHTHERIA-TETANUS-PERTUSSIS (TDAP) VACCINE: ICD-10-CM

## 2022-11-21 LAB
SL AMB  POCT GLUCOSE, UA: NEGATIVE
SL AMB POCT URINE PROTEIN: NEGATIVE

## 2022-11-21 RX ORDER — PNV NO.95/FERROUS FUM/FOLIC AC 28MG-0.8MG
1 TABLET ORAL DAILY
Qty: 90 TABLET | Refills: 1 | Status: SHIPPED | OUTPATIENT
Start: 2022-11-21 | End: 2023-05-20

## 2022-11-21 NOTE — PATIENT INSTRUCTIONS
Kick Counts in Pregnancy   WHAT YOU NEED TO KNOW:   Kick counts measure how much your baby is moving in your womb  A kick from your baby can be felt as a twist, turn, swish, roll, or jab  It is common to feel your baby kicking at 26 to 28 weeks of pregnancy  You may feel your baby kick as early as 20 weeks of pregnancy  You may want to start counting at 28 weeks  DISCHARGE INSTRUCTIONS:   Contact your doctor immediately if:   You feel a change in the number of kicks or movements of your baby  You feel fewer than 10 kicks within 2 hours  You have questions or concerns about your baby's movements  Why measure kick counts:  Your baby's movement may provide information about your baby's health  He or she may move less, or not at all, if there are problems  Your baby may move less if he or she is not getting enough oxygen or nutrition from the placenta  Do not smoke while you are pregnant  Smoking decreases the amount of oxygen that gets to your baby  Talk to your healthcare provider if you need help to quit smoking  Tell your healthcare provider as soon as you feel a change in your baby's movements  When to measure kick counts:   Measure kick counts at the same time every day  Measure kick counts when your baby is awake and most active  Your baby may be most active in the evening  How to measure kick counts:  Check that your baby is awake before you measure kick counts  You can wake up your baby by lightly pushing on your belly, walking, or drinking something cold  Your healthcare provider may tell you different ways to measure kick counts  You may be told to do the following:  Use a chart or clock to keep track of the time you start and finish counting  Sit in a chair or lie on your left side  Place your hands on the largest part of your belly  Count until you reach 10 kicks  Write down how much time it takes to count 10 kicks  It may take 30 minutes to 2 hours to count 10 kicks  It should not take more than 2 hours to count 10 kicks  Follow up with your doctor as directed:  Write down your questions so you remember to ask them during your visits  © Copyright [employer] 2022 Information is for End User's use only and may not be sold, redistributed or otherwise used for commercial purposes  All illustrations and images included in CareNotes® are the copyrighted property of A D A M , Inc  or [facility] Shay Traore   The above information is an  only  It is not intended as medical advice for individual conditions or treatments  Talk to your doctor, nurse or pharmacist before following any medical regimen to see if it is safe and effective for you  Kick Counts in Pregnancy   WHAT YOU NEED TO KNOW:   Kick counts measure how much your baby is moving in your womb  A kick from your baby can be felt as a twist, turn, swish, roll, or jab  It is common to feel your baby kicking at 26 to 28 weeks of pregnancy  You may feel your baby kick as early as 20 weeks of pregnancy  You may want to start counting at 28 weeks  DISCHARGE INSTRUCTIONS:   Contact your doctor immediately if:   You feel a change in the number of kicks or movements of your baby  You feel fewer than 10 kicks within 2 hours  You have questions or concerns about your baby's movements  Why measure kick counts:  Your baby's movement may provide information about your baby's health  He or she may move less, or not at all, if there are problems  Your baby may move less if he or she is not getting enough oxygen or nutrition from the placenta  Do not smoke while you are pregnant  Smoking decreases the amount of oxygen that gets to your baby  Talk to your healthcare provider if you need help to quit smoking  Tell your healthcare provider as soon as you feel a change in your baby's movements  When to measure kick counts:   Measure kick counts at the same time every day        Measure kick counts when your baby is awake and most active  Your baby may be most active in the evening  How to measure kick counts:  Check that your baby is awake before you measure kick counts  You can wake up your baby by lightly pushing on your belly, walking, or drinking something cold  Your healthcare provider may tell you different ways to measure kick counts  You may be told to do the following:  Use a chart or clock to keep track of the time you start and finish counting  Sit in a chair or lie on your left side  Place your hands on the largest part of your belly  Count until you reach 10 kicks  Write down how much time it takes to count 10 kicks  It may take 30 minutes to 2 hours to count 10 kicks  It should not take more than 2 hours to count 10 kicks  Follow up with your doctor as directed:  Write down your questions so you remember to ask them during your visits  © Copyright [employer] 2022 Information is for End User's use only and may not be sold, redistributed or otherwise used for commercial purposes  All illustrations and images included in CareNotes® are the copyrighted property of [employer]  or [facility] Shay Traore   The above information is an  only  It is not intended as medical advice for individual conditions or treatments  Talk to your doctor, nurse or pharmacist before following any medical regimen to see if it is safe and effective for you

## 2022-11-21 NOTE — PROGRESS NOTES
Routine Prenatal Visit  [address] [facility], [address], [facility], [address] [facility]    Assessment/Plan:  Luann Heimlich is a 23y o  year old  at 32w0d who presents for routine prenatal visit  1  32 weeks gestation of pregnancy  -     POCT urine dip  -     Prenatal Vit-Fe Fumarate-FA (prenatal vitamin) 28-0 8 mg; Take 1 tablet by mouth daily    2  Need for diphtheria-tetanus-pertussis (Tdap) vaccine  -     TDAP VACCINE GREATER THAN OR EQUAL TO 8YO IM    3  History of drug use    4  History of herpes genitalis  Comments:  no out breaks  aware of  valtrex at  36 weeks     5  BMI (body mass index), pediatric, greater than or equal to 95% for age    10  COVID-19 virus infection      + fm no UTCX,  TDAP today and flu shot in 2 weeks  Plans to breast feed  Denies use of  Street drugs    Repeat  UA and culture neg    Labs reviewed  Script given for  prenatal  Vitamins  Unable to afford   Plans to breast feed  Subjective:     CC: Prenatal care    Margie Shaw Monday is a 23 y o   female who presents for routine prenatal care at 32w0d  Pregnancy ROS: no  leakage of fluid, pelvic pain, or vaginal bleeding   + fetal movement  The following portions of the patient's history were reviewed and updated as appropriate: allergies, current medications, past family history, past medical history, obstetric history, gynecologic history, past social history, past surgical history and problem list       Objective:  /68 (BP Location: Left arm, Patient Position: Sitting, Cuff Size: Large)   Ht 5' 7 5" (1 715 m)   Wt 102 kg (224 lb 6 4 oz)   LMP  (LMP Unknown)   BMI 34 63 kg/m²   Pregravid Weight/BMI: Pregravid weight not on file (BMI Could not be calculated)  Current Weight: 102 kg (224 lb 6 4 oz)   Total Weight Gain: Not found     Pre-Akilah Vitals    Flowsheet Row Most Recent Value   Prenatal Assessment    Fetal Heart Rate 150-160   Fundal Height (cm) 333 cm   Movement Present   Prenatal Vitals Blood Pressure 101/68   Weight - Scale 102 kg (224 lb 6 4 oz)   Urine Albumin/Glucose    Dilation/Effacement/Station    Vaginal Drainage    Draining Fluid No   Edema    LLE Edema None   RLE Edema None   Facial Edema None           General: Well appearing, no distress  Respiratory: Unlabored breathing  Cardiovascular: Regular rate  Abdomen: Soft, gravid, nontender  Fundal Height: Appropriate for gestational age  Extremities: Warm and well perfused  Non tender

## 2022-12-08 ENCOUNTER — ROUTINE PRENATAL (OUTPATIENT)
Dept: OBGYN CLINIC | Facility: CLINIC | Age: 22
End: 2022-12-08

## 2022-12-08 VITALS
SYSTOLIC BLOOD PRESSURE: 100 MMHG | DIASTOLIC BLOOD PRESSURE: 68 MMHG | BODY MASS INDEX: 34.04 KG/M2 | WEIGHT: 224.6 LBS | HEIGHT: 68 IN

## 2022-12-08 DIAGNOSIS — Z34.93 THIRD TRIMESTER PREGNANCY: Primary | ICD-10-CM

## 2022-12-08 DIAGNOSIS — F19.91 HISTORY OF DRUG USE: ICD-10-CM

## 2022-12-08 DIAGNOSIS — Z86.19 HISTORY OF HERPES GENITALIS: ICD-10-CM

## 2022-12-08 DIAGNOSIS — Z3A.34 34 WEEKS GESTATION OF PREGNANCY: ICD-10-CM

## 2022-12-08 LAB
SL AMB  POCT GLUCOSE, UA: NEGATIVE
SL AMB POCT URINE PROTEIN: NEGATIVE

## 2022-12-08 RX ORDER — ASPIRIN 81 MG/1
162 TABLET ORAL DAILY
COMMUNITY

## 2022-12-08 RX ORDER — PNV NO.95/FERROUS FUM/FOLIC AC 28MG-0.8MG
1 TABLET ORAL DAILY
Qty: 90 TABLET | Refills: 1 | Status: SHIPPED | OUTPATIENT
Start: 2022-12-08 | End: 2022-12-08

## 2022-12-08 NOTE — PATIENT INSTRUCTIONS
LABOR PRECAUTIONS  Call our office at [phone] for any of the following:    * I need to call immediately I if I have even a small amount of LIQUID  leaking from my vagina, with or without contractions  * I need to call if I am BLEEDING an amount equal to or more than a period  A small amount of bloody vaginal discharge is normal at the end of the pregnancy  * I need to call if I am having CONTRACTIONS  every five minutes for at least an hour  I will need a watch in order to time them  I should time them from the beginning of one contraction until the beginning of the next one  * I need to call BEFORE  I go to the hospital    * I need to have a plan for TRANSPORTATION  to get to the hospital when I am in labor  Labor is generally not an emergency which requires an ambulance  FETAL KICK COUNTS    In the third trimester (after 28 weeks gestation) you should be performing fetal kick counts every day  Your baby should move at least 10 times in 2 hours during an active time, once a day  Choose atime of day when your baby is most active  Try to do this around the same time each day  Get into a comfortable position and then write down the time your baby first moves  Count each movement until the baby moves 10 times  These movements include kicks, punches, nudges, flutters, or rolls  This can take anywhere from 5 minutes to 2 hours  Write down the time you feel the baby's 10th movement  If 2 hours has passed and your baby has not moved at least 10 times, you should CALL THE OFFICE RIGHT AWAY  [phone]        PERINEAL / VAGINAL MASSAGE    What can I do now to decrease my chances of tearing during delivery? Massaging around the vaginal opening by you (or your partner), either antepartum (before birth) or during the second stage of labor, can reduce the likelihood of perineal tearing during childbirth    Likewise, the use of warm packs held on the perineum during the pushing stage of labor can reduce the severity of your tear  This will happen during the pushing stage of labor  At home, you can also help reduce the chances of injury that may occur during the birth of your child through perineal massage  When should I do this? Starting around or shortly after 34 weeks of pregnancy, you or your partner should provide 5-10 minutes of vaginal massage 1-4 times per week  How? Use either almond, coconut, or olive oil and water mixture on 1 or 2 fingers (depending on comfort)  Insert finger(s) 3-5cm into the vagina  Apply sweeping downward/sideward pressure from 3 to 9 o'clock for 5-10 minutes, 1-4 times per week  GROUP B STREP    Group B Strep (GBS) is a common vaginal bacteria  Approximately 25% of women normally have GBS bacteria present in the vagina  It is NOT a sexually-transmitted infection  In fact, it is not an infection AT ALL! It is just a normal vaginal bacteria for many women  However, the GBS bacteria can be dangerous to a  baby if the baby is exposed to that particular bacteria during labor and birth AND develops an infection from it  The likelihood of a  GBS infection for a woman who has GBS bacteria in the vagina is about 1%-2%  But if it does occur, a baby could become severely ill  For this reason, we do a vaginal culture (Q-tip swab of the vagina and rectum) for ALL pregnant women at approximately 36 weeks of pregnancy  If the culture shows that there is GBS bacteria present, it is NOT a reason to panic! Because in this situation we will give this woman antibiotics through her IV while she is in labor  When a mother is treated with antibiotics during labor and delivery, her baby ALMOST NEVER becomes infected with GBS bacteria

## 2022-12-08 NOTE — PROGRESS NOTES
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% for age    11  26 weeks gestation of pregnancy      US and labs reviewed  Declines  Flu shot    + FM      Subjective:     CC: Prenatal care    Margie Morris is a 23 y o   female who presents for routine prenatal care at 34w3d  Pregnancy ROS: no  leakage of fluid, pelvic pain, or vaginal bleeding   +  fetal movement  The following portions of the patient's history were reviewed and updated as appropriate: allergies, current medications, past family history, past medical history, obstetric history, gynecologic history, past social history, past surgical history and problem list       Objective:  /68   Ht 5' 7 5" (1 715 m)   Wt 102 kg (224 lb 9 6 oz)   LMP  (LMP Unknown)   BMI 34 66 kg/m²   Pregravid Weight/BMI: Pregravid weight not on file (BMI Could not be calculated)  Current Weight: 102 kg (224 lb 9 6 oz)   Total Weight Gain: Not found  Pre- Vitals    Flowsheet Row Most Recent Value   Prenatal Assessment    Fetal Heart Rate 132-148   Fundal Height (cm) 35 cm   Movement Present   Prenatal Vitals    Blood Pressure 100/68   Weight - Scale 102 kg (224 lb 9 6 oz)   Urine Albumin/Glucose    Dilation/Effacement/Station    Vaginal Drainage    Draining Fluid No   Edema    LLE Edema Trace   RLE Edema Trace   Facial Edema None           General: Well appearing, no distress  Respiratory: Unlabored breathing  Cardiovascular: Regular rate  Abdomen: Soft, gravid, nontender  Fundal Height: Appropriate for gestational age  Extremities: Warm and well perfused  Non tender

## 2022-12-13 NOTE — PROGRESS NOTES
Patient completed her breast pump order online with [facility]

## 2022-12-22 ENCOUNTER — ROUTINE PRENATAL (OUTPATIENT)
Dept: OBGYN CLINIC | Facility: CLINIC | Age: 22
End: 2022-12-22

## 2022-12-22 VITALS
SYSTOLIC BLOOD PRESSURE: 102 MMHG | BODY MASS INDEX: 34.53 KG/M2 | DIASTOLIC BLOOD PRESSURE: 60 MMHG | WEIGHT: 227.8 LBS | HEIGHT: 68 IN

## 2022-12-22 DIAGNOSIS — F19.91 HISTORY OF DRUG USE: ICD-10-CM

## 2022-12-22 DIAGNOSIS — Z36.85 ANTENATAL SCREENING FOR STREPTOCOCCUS B: ICD-10-CM

## 2022-12-22 DIAGNOSIS — Z3A.36 36 WEEKS GESTATION OF PREGNANCY: ICD-10-CM

## 2022-12-22 DIAGNOSIS — Z86.19 HISTORY OF HERPES GENITALIS: ICD-10-CM

## 2022-12-22 DIAGNOSIS — Z34.03 FIRST PREGNANCY, THIRD TRIMESTER: Primary | ICD-10-CM

## 2022-12-22 LAB
SL AMB  POCT GLUCOSE, UA: NEGATIVE
SL AMB POCT URINE PROTEIN: + 1

## 2022-12-22 RX ORDER — VALACYCLOVIR HYDROCHLORIDE 500 MG/1
500 TABLET, FILM COATED ORAL 2 TIMES DAILY
Qty: 60 TABLET | Refills: 1 | Status: SHIPPED | OUTPATIENT
Start: 2022-12-22 | End: 2023-01-21

## 2022-12-22 NOTE — PROGRESS NOTES
Routine Prenatal Visit  [address]   [address], [address], [place] [facility]    Assessment/Plan:  Roxy Cook is a 23y o  year old  at 43w3d who presents for routine prenatal visit  1  First pregnancy, third trimester    2  History of drug use    3  History of herpes genitalis  Comments:  Start Valtrex  500 mg  bid  till delivery   Orders:  -     valACYclovir (VALTREX) 500 mg tablet; Take 1 tablet (500 mg total) by mouth 2 (two) times a day    4  BMI (body mass index), pediatric, greater than or equal to 95% for age    11   screening for streptococcus B  -     Strep B DNA probe, amplification    6  36 weeks gestation of pregnancy  Comments:  Stop ASA  , Start  valtrx 500 mg bid,   fetal kick counts  Reviewed S+S of labor, PIH amd  ROM   Orders:  -     POCT urine dip    Pt verbalized that she prefers to deliver at   [facility]  She does not want the  Physicians  At  [facility]  Delivering her baby  Family member had  A poor experience there   Instructed she must  Transfer her care to deliver at another hospital   Currently 36 weeks pregnant  Initial BP slightly elevated  Repeat wnl   Kick counts dw pt  Stop ASA and  Start  Valtrex 500 mg  Bid till delivery  If pt desires to transfer care will need to  Schedule  Fu in one week       Subjective:     CC: Prenatal care    Margie Lovell is a 23 y o   female who presents for routine prenatal care at 36w3d  Pregnancy ROS: no  leakage of fluid, pelvic pain, or vaginal bleeding   + fetal movement      The following portions of the patient's history were reviewed and updated as appropriate: allergies, current medications, past family history, past medical history, obstetric history, gynecologic history, past social history, past surgical history and problem list       Objective:  /60 (BP Location: Left arm, Patient Position: Sitting, Cuff Size: Large)   Ht 5' 7 5" (1 715 m)   Wt 103 kg (227 lb 12 8 oz)   LMP  (LMP Unknown)   BMI 35 15 kg/m²   Pregravid Weight/BMI: Pregravid weight not on file (BMI Could not be calculated)  Current Weight: 103 kg (227 lb 12 8 oz)   Total Weight Gain: Not found  Pre-Akilah Vitals    Flowsheet Row Most Recent Value   Prenatal Assessment    Fetal Heart Rate 138-145   Fundal Height (cm) 38 cm   Movement Present   Prenatal Vitals    Blood Pressure 102/60   Weight - Scale 103 kg (227 lb 12 8 oz)   Urine Albumin/Glucose    Dilation/Effacement/Station    Vaginal Drainage    Draining Fluid No   Edema    LLE Edema Trace   RLE Edema Trace   Facial Edema None           General: Well appearing, no distress  Respiratory: Unlabored breathing  Cardiovascular: Regular rate  Abdomen: Soft, gravid, nontender  Fundal Height: Appropriate for gestational age  Extremities: Warm and well perfused  Non tender

## 2022-12-24 LAB — GP B STREP DNA SPEC QL NAA+PROBE: NEGATIVE

## 2023-01-11 ENCOUNTER — ROUTINE PRENATAL (OUTPATIENT)
Dept: OBGYN CLINIC | Facility: CLINIC | Age: 23
End: 2023-01-11

## 2023-01-11 VITALS
HEIGHT: 68 IN | SYSTOLIC BLOOD PRESSURE: 118 MMHG | DIASTOLIC BLOOD PRESSURE: 82 MMHG | BODY MASS INDEX: 34.59 KG/M2 | WEIGHT: 228.2 LBS

## 2023-01-11 DIAGNOSIS — F19.91 HISTORY OF DRUG USE: ICD-10-CM

## 2023-01-11 DIAGNOSIS — Z34.93 THIRD TRIMESTER PREGNANCY: ICD-10-CM

## 2023-01-11 DIAGNOSIS — Z86.19 HISTORY OF HERPES GENITALIS: ICD-10-CM

## 2023-01-11 DIAGNOSIS — Z3A.39 39 WEEKS GESTATION OF PREGNANCY: Primary | ICD-10-CM

## 2023-01-11 LAB
SL AMB  POCT GLUCOSE, UA: NEGATIVE
SL AMB POCT URINE PROTEIN: NEGATIVE

## 2023-01-11 RX ORDER — VALACYCLOVIR HYDROCHLORIDE 500 MG/1
500 TABLET, FILM COATED ORAL 2 TIMES DAILY
Qty: 30 TABLET | Refills: 1 | Status: SHIPPED | OUTPATIENT
Start: 2023-01-11 | End: 2023-01-19

## 2023-01-11 NOTE — PROGRESS NOTES
Routine Prenatal Visit  [address]   [address], [address], [place] [facility] [place] [facility]    Assessment/Plan:  Tea Tan is a 23y o  year old  at 44w2d who presents for routine prenatal visit  1  39 weeks gestation of pregnancy  -     POCT urine dip    2  History of drug use    3  History of herpes genitalis  -  Patient states she lost the bottle of Valtrex and has not taken the pills for 4 days  Denies having any discomfort at perineal/vulva/vaginal area  -  Rx for Valtrex provided  Advised patient to start today  -  valACYclovir (VALTREX) 500 mg tablet; Take 1 tablet (500 mg total) by mouth 2 (two) times a day    4  BMI (body mass index), pediatric, greater than or equal to 95% for age    11  Third trimester pregnancy       -  Patient desires to deliver on her due date  Desires IOL  Reviewed with patient that any pregnant women can undergo an elective induction of labor at 39 weeks or later, depending on hospital availability  One study showed that induction of labor of first time mothers at 43 weeks compared to waiting until 41 weeks, decreased  rate by 3%, and decreased risk of developing high blood pressure in pregnancy  Alternatively induction of labor can often result in longer in-hospital stays overall, than allowing for spontaneous labor  This is especially true in patients who have an unfavorable cervix and require the additional step of cervical ripening prior to induction of labor - leading often to an additional 12-24 hours in the hospital prior to delivery, during which there is continuous fetal monitoring  For all patients who have not gone into labor spontaneously, we recommend induction of labor between 40 6-41 6 weeks, due to increase risk of stillbirth  Pregnancies past 40 6 weeks who are not in the process of induction, we recommend  testing with NST and BRUNO 2x/week      Discussed these pros and cons in relation to patient's desires for her delivery process, as well as the fact that she is a healthy patient with no other risk factors  She wishes to proceed with cervical ripening on 1/15/2023 in PM for Elective IOL  Dr Jocelyn Hutson and Dr Michaelle Katz informed of patient's request and patient scheduled for 1/15/2023  Next Visit:  postpartum    Subjective:     CC: Prenatal care    Margie Freeman is a 23 y o   female who presents for routine prenatal care at 39w2d  Pregnancy ROS: no leakage of fluid, pelvic pain, or vaginal bleeding  normal fetal movement  The following portions of the patient's history were reviewed and updated as appropriate: allergies, current medications, past family history, past medical history, obstetric history, gynecologic history, past social history, past surgical history and problem list       Objective:  /82   Ht 5' 7 5" (1 715 m)   Wt 104 kg (228 lb 3 2 oz)   LMP  (LMP Unknown)   BMI 35 21 kg/m²   Pregravid Weight/BMI: Pregravid weight not on file (BMI Could not be calculated)  Current Weight: 104 kg (228 lb 3 2 oz)   Total Weight Gain: Not found  Pre- Vitals    Flowsheet Row Most Recent Value   Prenatal Assessment    Fetal Heart Rate 150   Fundal Height (cm) 39 cm   Movement Present   Presentation Vertex   Prenatal Vitals    Blood Pressure 118/82   Weight - Scale 104 kg (228 lb 3 2 oz)   Urine Albumin/Glucose    Dilation/Effacement/Station    Cervical Dilation 0   Cervical Effacement 40   Fetal Station -2   Vaginal Drainage    Draining Fluid No   Edema    LLE Edema Trace   RLE Edema Trace   Facial Edema None           General: Well appearing, no distress  Abdomen: Soft, gravid, nontender  Extremities: Non tender

## 2023-01-15 ENCOUNTER — HOSPITAL ENCOUNTER (INPATIENT)
Facility: HOSPITAL | Age: 23
LOS: 4 days | Discharge: HOME | End: 2023-01-19
Attending: OBSTETRICS & GYNECOLOGY | Admitting: OBSTETRICS & GYNECOLOGY

## 2023-01-15 ENCOUNTER — HOSPITAL ENCOUNTER (OUTPATIENT)
Facility: HOSPITAL | Age: 23
Discharge: HOME | End: 2023-01-15
Attending: OBSTETRICS & GYNECOLOGY | Admitting: OBSTETRICS & GYNECOLOGY

## 2023-01-15 ENCOUNTER — HOSPITAL ENCOUNTER (OUTPATIENT)
Dept: LABOR AND DELIVERY | Facility: HOSPITAL | Age: 23
Discharge: HOME | End: 2023-01-15

## 2023-01-15 VITALS — SYSTOLIC BLOOD PRESSURE: 131 MMHG | HEART RATE: 108 BPM | DIASTOLIC BLOOD PRESSURE: 82 MMHG

## 2023-01-15 PROBLEM — Z34.90 ENCOUNTER FOR ELECTIVE INDUCTION OF LABOR: Status: ACTIVE | Noted: 2023-01-15

## 2023-01-15 PROBLEM — Z3A.39 39 WEEKS GESTATION OF PREGNANCY: Status: ACTIVE | Noted: 2022-12-08

## 2023-01-15 LAB
ABO GROUP BLD: NORMAL
AMPHETAMINES SERPL QL SCN: NEGATIVE
BARBITURATES UR QL: NEGATIVE
BASOPHILS # BLD AUTO: 0.04 THOUSANDS/ÂΜL (ref 0–0.1)
BASOPHILS NFR BLD AUTO: 0 % (ref 0–1)
BENZODIAZ UR QL: NEGATIVE
BLD GP AB SCN SERPL QL: NEGATIVE
COCAINE UR QL: NEGATIVE
EOSINOPHIL # BLD AUTO: 0.06 THOUSAND/ÂΜL (ref 0–0.61)
EOSINOPHIL NFR BLD AUTO: 1 % (ref 0–6)
ERYTHROCYTE [DISTWIDTH] IN BLOOD BY AUTOMATED COUNT: 14.3 % (ref 11.6–15.1)
HCT VFR BLD AUTO: 38.9 % (ref 34.8–46.1)
HGB BLD-MCNC: 12.8 G/DL (ref 11.5–15.4)
IMM GRANULOCYTES # BLD AUTO: 0.06 THOUSAND/UL (ref 0–0.2)
IMM GRANULOCYTES NFR BLD AUTO: 1 % (ref 0–2)
LYMPHOCYTES # BLD AUTO: 2.4 THOUSANDS/ÂΜL (ref 0.6–4.47)
LYMPHOCYTES NFR BLD AUTO: 24 % (ref 14–44)
MCH RBC QN AUTO: 27.7 PG (ref 26.8–34.3)
MCHC RBC AUTO-ENTMCNC: 32.9 G/DL (ref 31.4–37.4)
MCV RBC AUTO: 84 FL (ref 82–98)
METHADONE UR QL: NEGATIVE
MONOCYTES # BLD AUTO: 0.56 THOUSAND/ÂΜL (ref 0.17–1.22)
MONOCYTES NFR BLD AUTO: 6 % (ref 4–12)
NEUTROPHILS # BLD AUTO: 6.77 THOUSANDS/ÂΜL (ref 1.85–7.62)
NEUTS SEG NFR BLD AUTO: 68 % (ref 43–75)
NRBC BLD AUTO-RTO: 0 /100 WBCS
OPIATES UR QL SCN: NEGATIVE
OXYCODONE+OXYMORPHONE UR QL SCN: NEGATIVE
PCP UR QL: NEGATIVE
PLATELET # BLD AUTO: 228 THOUSANDS/UL (ref 149–390)
PMV BLD AUTO: 10.8 FL (ref 8.9–12.7)
RBC # BLD AUTO: 4.62 MILLION/UL (ref 3.81–5.12)
RH BLD: POSITIVE
SPECIMEN EXPIRATION DATE: NORMAL
THC UR QL: NEGATIVE
WBC # BLD AUTO: 9.89 THOUSAND/UL (ref 4.31–10.16)

## 2023-01-15 PROCEDURE — 4A1HXCZ MONITORING OF PRODUCTS OF CONCEPTION, CARDIAC RATE, EXTERNAL APPROACH: ICD-10-PCS | Performed by: OBSTETRICS & GYNECOLOGY

## 2023-01-15 RX ORDER — ONDANSETRON 2 MG/ML
4 INJECTION INTRAMUSCULAR; INTRAVENOUS EVERY 6 HOURS PRN
Status: DISCONTINUED | OUTPATIENT
Start: 2023-01-15 | End: 2023-01-16

## 2023-01-15 RX ORDER — BUPIVACAINE HYDROCHLORIDE 2.5 MG/ML
30 INJECTION, SOLUTION EPIDURAL; INFILTRATION; INTRACAUDAL ONCE AS NEEDED
Status: DISCONTINUED | OUTPATIENT
Start: 2023-01-15 | End: 2023-01-16

## 2023-01-15 RX ORDER — VALACYCLOVIR HYDROCHLORIDE 500 MG/1
500 TABLET, FILM COATED ORAL EVERY 12 HOURS SCHEDULED
Status: DISCONTINUED | OUTPATIENT
Start: 2023-01-15 | End: 2023-01-16

## 2023-01-15 RX ORDER — SODIUM CHLORIDE, SODIUM LACTATE, POTASSIUM CHLORIDE, CALCIUM CHLORIDE 600; 310; 30; 20 MG/100ML; MG/100ML; MG/100ML; MG/100ML
125 INJECTION, SOLUTION INTRAVENOUS CONTINUOUS
Status: DISCONTINUED | OUTPATIENT
Start: 2023-01-15 | End: 2023-01-16

## 2023-01-15 RX ADMIN — VALACYCLOVIR HYDROCHLORIDE 500 MG: 500 TABLET, FILM COATED ORAL at 15:31

## 2023-01-15 RX ADMIN — Medication 50 MCG: at 21:14

## 2023-01-15 RX ADMIN — SODIUM CHLORIDE, SODIUM LACTATE, POTASSIUM CHLORIDE, AND CALCIUM CHLORIDE 125 ML/HR: 600; 310; 30; 20 INJECTION, SOLUTION INTRAVENOUS at 15:25

## 2023-01-15 RX ADMIN — Medication 25 MCG: at 17:08

## 2023-01-15 RX ADMIN — SODIUM CHLORIDE, SODIUM LACTATE, POTASSIUM CHLORIDE, AND CALCIUM CHLORIDE 125 ML/HR: 600; 310; 30; 20 INJECTION, SOLUTION INTRAVENOUS at 23:02

## 2023-01-15 NOTE — H&P
H&P Exam - Obstetrics   Margie Pena 23 y o  female MRN: [id number]  Unit/Bed#: -01 Encounter: [id number]    Assessment/Plan     Assessment:  39W6D gestation for elective IOL at term- not currently ruptured membranes    Plan:  1   39W6D gestation - NST reactive  2  IOL - admit, IV start, admission labs, cervix closed so will need to start with misoprostol prior to placement of myers bulb  3  History of herpes genitalis - no lesions visible;  Patient denies any signs of outbreaks at this time, took Valtrex 23  4  Labor management - considering epidural    D/W Dr Sienna Carl via [address] [place] [address]  Susanna Malik MD  OB/GYN Hospitalist  [phone]  1/15/2023   2:54 PM    ____________________________________________________    History of Present Illness   Chief Complaint: "I am here to be induced, my water may be broken"    HPI:  Ignacia Gresham is a 23 y o   female with an JEAN CARLOS of 2023, by Ultrasound at 39w6d weeks gestation who is being admitted for elective induction of labor  Her current obstetrical history is significant for history of herpes genitalis - no lesions noted  Contractions: None  Leakage of fluid: feeling "wet"  Bleeding: None  Fetal movement: present  Pregnancy complications: history of herpes genitalis, history of drug use  Review of Systems   Constitutional: Negative for activity change, appetite change, fatigue and fever  HENT: Negative for hearing loss, mouth sores, rhinorrhea, sinus pressure and sore throat  Eyes: Negative for photophobia and visual disturbance  Respiratory: Negative for cough, chest tightness and shortness of breath  Cardiovascular: Negative for palpitations and leg swelling  Gastrointestinal: Positive for constipation  Negative for abdominal pain, diarrhea, nausea and vomiting  Endocrine: Negative for heat intolerance  Genitourinary: Positive for vaginal discharge (uncertain of color)   Negative for dysuria, flank pain and urgency  Musculoskeletal: Positive for back pain  Negative for joint swelling and myalgias  Skin: Negative for rash and wound  Allergic/Immunologic: Negative for immunocompromised state  Neurological: Negative for syncope, weakness and headaches  Hematological: Does not bruise/bleed easily  Psychiatric/Behavioral: Negative for agitation and confusion  The patient is nervous/anxious (soft spoken, difficult to hear her responses)  Historical Information   OB History    Para Term  AB Living   1             SAB IAB Ectopic Multiple Live Births                  # Outcome Date GA Lbr Jem/2nd Weight Sex Delivery Anes PTL Lv   1 Current              Baby complications/comments:   Past Medical History:   Diagnosis Date   • Herpes 2018     No past surgical history on file  Social History   Social History     Substance and Sexual Activity   Alcohol Use Not Currently    Comment: Special occasions only     Social History     Substance and Sexual Activity   Drug Use Not Currently   • Frequency: 7 0 times per week   • Types: Marijuana     Social History     Tobacco Use   Smoking Status Never   Smokeless Tobacco Never     E-Cigarette/Vaping     E-Cigarette/Vaping Substances     Family History:   Family History   Problem Relation Age of Onset   • Diabetes Mother    • Miscarriages / Stillbirths Mother    • Heart failure Father         I wouldnt necessarily say Heart Failure but my dad has alot of heart problems some in which he doesnt disclose with me   • Stroke Father    • Diabetes Maternal Grandmother    • Diabetes Sister    • Migraines Sister        Meds/Allergies   PTA meds:   Prior to Admission Medications   Prescriptions Last Dose Informant Patient Reported? Taking?    Prenatal MV & Min w/FA-DHA (Prenatal Gummies) 0 18-25 MG CHEW 2023  Yes Yes   Sig: Chew 1 gum Daily at 2am   valACYclovir (VALTREX) 500 mg tablet 2023  No Yes   Sig: Take 1 tablet (500 mg total) by mouth 2 (two) times a day      Facility-Administered Medications: None     No Known Allergies    Objective   Vitals: Blood pressure 128/78, pulse 88, temperature 97 8 °F (36 6 °C), temperature source Temporal, resp  rate 20  There is no height or weight on file to calculate BMI  Invasive Devices     None                 Physical Exam  Constitutional:       General: She is not in acute distress  Appearance: Normal appearance  She is not ill-appearing, toxic-appearing or diaphoretic  HENT:      Head: Normocephalic and atraumatic  Right Ear: External ear normal       Left Ear: External ear normal       Nose: Nose normal       Mouth/Throat:      Mouth: Mucous membranes are moist       Pharynx: Oropharynx is clear  Eyes:      General: No scleral icterus  Conjunctiva/sclera: Conjunctivae normal    Cardiovascular:      Rate and Rhythm: Normal rate  Pulses: Normal pulses  Heart sounds: Normal heart sounds  Pulmonary:      Effort: Pulmonary effort is normal       Breath sounds: Normal breath sounds  Abdominal:      General: Bowel sounds are normal  There is no distension  Palpations: Abdomen is soft  There is mass (gravid term uterus)  Tenderness: There is no abdominal tenderness  There is no guarding or rebound  Genitourinary:     General: Normal vulva  Vagina: No vaginal discharge (physiologic white vaginal discharge, small amount)  Comments: No herpetic lesions noted;  Sterile speculum exam negative for pooling - small amount of physiologic discharge noted;  Nothing to test for ferning or nitrizine  Musculoskeletal:         General: No deformity  Cervical back: Neck supple  Right lower leg: No edema  Left lower leg: No edema  Skin:     General: Skin is warm  Capillary Refill: Capillary refill takes less than 2 seconds  Coloration: Skin is not jaundiced  Findings: No bruising or lesion     Neurological:      General: No focal deficit present  Mental Status: She is alert  Mental status is at baseline  Coordination: Coordination normal    Psychiatric:         Mood and Affect: Mood normal          Behavior: Behavior normal          Thought Content: Thought content normal          Judgment: Judgment normal      FHTs - baseline 130, accelerations 160, no decelerations, moderate variability, reactive  toco - irregular  Cervix - Closed/50/-2 posterior     Prenatal Labs: I have personally reviewed pertinent reports    , Blood Type:   Lab Results   Component Value Date/Time    ABO Grouping O 06/21/2022 12:00 AM     , D (Rh type): No results found for: RH  , Antibody Screen: No results found for: ANTIBODYSCR , HCT/HGB:   Lab Results   Component Value Date/Time    HCT 34 8 10/31/2022 12:19 PM    Hemoglobin 11 5 10/31/2022 12:19 PM      , MCV:   Lab Results   Component Value Date/Time    MCV 86 10/31/2022 12:19 PM      , Platelets:   Lab Results   Component Value Date/Time    Platelet Count 561 69/86/6597 12:19 PM      , 1 hour Glucola:   Lab Results   Component Value Date/Time    Glucose 101 10/31/2022 12:19 PM   , 3 hour GTT: No results found for: CKSXDQT7WG, Varicella: No results found for: VARICELLAIGG    , Rubella: No results found for: RUBELLAIGGQT     , VDRL/RPR:   Lab Results   Component Value Date/Time    RPR Non Reactive 10/31/2022 12:19 PM      , Urine Culture/Screen: No results found for: URINECX    , Urine Drug Screen: No results found for: AMPHETUR, BARBTUR, BDZUR, THCUR, COCAINEUR, METHADONEUR, OPIATEUR, PCPUR, MTHAMUR, ECSTASYUR, TRICYCLICSUR, Hep B:   Lab Results   Component Value Date/Time    Hepatitis B Surface Ag neg 06/21/2022 12:00 AM     , Hep C: No components found for: HEPCSAG, EXTHEPCSAG   , HIV:   Lab Results   Component Value Date/Time    HIV-1/HIV-2 AB Non-Reactive 06/21/2022 12:00 AM     , Chlamydia: No results found for: EXTCHLAMYDIA  , Gonorrhea: No results found for: LABNGO  , Group B Strep:    Lab Results Component Value Date/Time    Strep Grp B SHIN Negative 12/22/2022 01:23 PM          Imaging, EKG, Pathology, and Other Studies: 11/18/2022 US vertex, 4lb 3oz 54 %tile, normal anatomy

## 2023-01-15 NOTE — PROCEDURES
León Hernandez, a  at [id number] with an JEAN CARLOS of 2023, by Ultrasound, was seen at [address] [place] [address],[address] for the following procedure(s): $Procedure Type: BRUNO, NST]    Nonstress Test  Variability: Moderate  Decelerations: None  Accelerations: Yes  Acoustic Stimulator: No  Uterine Irritability: No  Contractions: Irregular    4 Quadrant BRUNO  BRUNO Q1 (cm): 2 3 cm  BRUNO Q2 (cm): 4 9 cm  BRUNO Q3 (cm): 0 cm  BRUNO Q4 (cm): 0 cm  BRUNO TOTAL (cm): 7 2 cm  LVP (cm): 4 9 cm              Interpretation  Nonstress Test Interpretation: Reactive  Overall Impression: Reassuring

## 2023-01-15 NOTE — PLAN OF CARE
Problem: BIRTH - VAGINAL/ SECTION  Goal: Fetal and maternal status remain reassuring during the birth process  Description: INTERVENTIONS:  - Monitor vital signs  - Monitor fetal heart rate  - Monitor uterine activity  - Monitor labor progression (vaginal delivery)  - DVT prophylaxis  - Antibiotic prophylaxis  Outcome: Progressing  Goal: Emotionally satisfying birthing experience for mother/fetus  Description: Interventions:  - Assess, plan, implement and evaluate the nursing care given to the patient in labor  - Advocate the philosophy that each childbirth experience is a unique experience and support the family's chosen level of involvement and control during the labor process   - Actively participate in both the patient's and family's teaching of the birth process  - Consider cultural, Amish and age-specific factors and plan care for the patient in labor  Outcome: Progressing     Problem: Knowledge Deficit  Goal: Verbalizes understanding of labor plan  Description: Assess patient/family/caregiver's baseline knowledge level and ability to understand information  Provide education via patient/family/caregiver's preferred learning method at appropriate level of understanding  1  Provide teaching at level of understanding  2  Provide teaching via preferred learning method(s)  Outcome: Progressing     Problem: Labor & Delivery  Goal: Manages discomfort  Description: Assess and monitor for signs and symptoms of discomfort  Assess patient's pain level regularly and per hospital policy  Administer medications as ordered  Support use of nonpharmacological methods to help control pain such as distraction, imagery, relaxation, and application of heat and cold  Collaborate with interdisciplinary team and patient to determine appropriate pain management plan  1  Include patient in decisions related to comfort  2  Offer non-pharmacological pain management interventions    3  Report ineffective pain management to physician  Outcome: Progressing  Goal: Patient vital signs are stable  Description: 1  Assess vital signs - vaginal delivery    Outcome: Progressing

## 2023-01-15 NOTE — PROGRESS NOTES
Triage Note - OB  Margie Corcoran 23 y o  female MRN: [id number]  Unit/Bed#: -01 Encounter: [id number]    Chief Complaint:     SUBJECTIVE    HPI: 23 y o   at 37w11d with c/o gush of fluid at 4am  She reports that she was in bed and felt movement and a small gush  She also felt contractions that are mild since that time  neg vaginal bleeding   pos fetal movement    Pain:   Pt's last coitus: last night  Constitutional sx: Pt denies dysuria, hematuria, vaginal pruritis, vaginal pain, fever, chills, nausea, vomiting, diarrhea, constipation  OBJECTIVE    GBS: neg  Blood type: O+    Estimated Date of Delivery: 23    Vitals: /82   Pulse (!) 108   LMP  (LMP Unknown)   There is no height or weight on file to calculate BMI  FHR: 125, reactive  [place]: irregular    PE:  Gen: NAD  Abd: S/NT/ND  Extrem: no edema  SVE: closed, thicck, firm    Speculum: long cervix, closed, no fluid seen    Workup:    External genitalia appears dry   Negative pooling   Positive Nitrazine  She does report sex last night   Negative fern   No HSV lesions seen    TAUS - BRUNO: 7 19 cm  TAUS - Presentation: vtx  TAUS - Placenta: Anterior      Labs:   No visits with results within 1 Day(s) from this visit  Latest known visit with results is:   Routine Prenatal on 2023   Component Date Value   • POCT URINE PROTEIN 2023 negative    • GLUCOSE, UA 2023 negative        A/P  23 y o  female  at 37w11d with c/o gush  Testing not consistent with rupture of membranes  Return this evening for IOL  Return sooner if DFM, leaking fluid, bleeding, or labor      Maria Guadalupe Lemus MD   OB-GYN  1/15/2023 6:29 AM

## 2023-01-16 ENCOUNTER — ANESTHESIA (INPATIENT)
Dept: ANESTHESIOLOGY | Facility: HOSPITAL | Age: 23
End: 2023-01-16

## 2023-01-16 ENCOUNTER — ANESTHESIA EVENT (INPATIENT)
Dept: ANESTHESIOLOGY | Facility: HOSPITAL | Age: 23
End: 2023-01-16

## 2023-01-16 LAB
BASE EXCESS BLDCOV CALC-SCNC: -8.7 MMOL/L (ref 1–9)
HCO3 BLDCOV-SCNC: 16.6 MMOL/L (ref 12.2–28.6)
OXYHGB MFR BLDCOV: 75.9 %
PCO2 BLDCOV: 34.3 MM HG (ref 27–43)
PH BLDCOV: 7.3 [PH] (ref 7.19–7.49)
PO2 BLDCOV: 35.6 MM HG (ref 15–45)
RPR SER QL: NORMAL
SAO2 % BLDCOV: 16.5 ML/DL

## 2023-01-16 PROCEDURE — 0HQ9XZZ REPAIR PERINEUM SKIN, EXTERNAL APPROACH: ICD-10-PCS | Performed by: OBSTETRICS & GYNECOLOGY

## 2023-01-16 RX ORDER — OXYCODONE HYDROCHLORIDE 5 MG/1
5 TABLET ORAL EVERY 4 HOURS PRN
Status: DISCONTINUED | OUTPATIENT
Start: 2023-01-16 | End: 2023-01-19 | Stop reason: HOSPADM

## 2023-01-16 RX ORDER — DIAPER,BRIEF,INFANT-TODD,DISP
1 EACH MISCELLANEOUS DAILY PRN
Status: DISCONTINUED | OUTPATIENT
Start: 2023-01-16 | End: 2023-01-19 | Stop reason: HOSPADM

## 2023-01-16 RX ORDER — IBUPROFEN 600 MG/1
600 TABLET ORAL EVERY 6 HOURS
Status: DISCONTINUED | OUTPATIENT
Start: 2023-01-16 | End: 2023-01-19 | Stop reason: HOSPADM

## 2023-01-16 RX ORDER — BUTORPHANOL TARTRATE 1 MG/ML
1 INJECTION, SOLUTION INTRAMUSCULAR; INTRAVENOUS ONCE
Status: COMPLETED | OUTPATIENT
Start: 2023-01-16 | End: 2023-01-16

## 2023-01-16 RX ORDER — OXYTOCIN/RINGER'S LACTATE 30/500 ML
62.5 PLASTIC BAG, INJECTION (ML) INTRAVENOUS CONTINUOUS
Status: DISCONTINUED | OUTPATIENT
Start: 2023-01-16 | End: 2023-01-19 | Stop reason: HOSPADM

## 2023-01-16 RX ORDER — SIMETHICONE 80 MG
80 TABLET,CHEWABLE ORAL 4 TIMES DAILY PRN
Status: DISCONTINUED | OUTPATIENT
Start: 2023-01-16 | End: 2023-01-19 | Stop reason: HOSPADM

## 2023-01-16 RX ORDER — OXYTOCIN/RINGER'S LACTATE 30/500 ML
250 PLASTIC BAG, INJECTION (ML) INTRAVENOUS ONCE
Status: DISCONTINUED | OUTPATIENT
Start: 2023-01-16 | End: 2023-01-19 | Stop reason: HOSPADM

## 2023-01-16 RX ORDER — ACETAMINOPHEN 325 MG/1
650 TABLET ORAL EVERY 4 HOURS PRN
Status: DISCONTINUED | OUTPATIENT
Start: 2023-01-16 | End: 2023-01-19 | Stop reason: HOSPADM

## 2023-01-16 RX ORDER — LIDOCAINE HYDROCHLORIDE AND EPINEPHRINE 15; 5 MG/ML; UG/ML
INJECTION, SOLUTION EPIDURAL AS NEEDED
Status: DISCONTINUED | OUTPATIENT
Start: 2023-01-16 | End: 2023-01-16 | Stop reason: HOSPADM

## 2023-01-16 RX ORDER — OXYTOCIN/RINGER'S LACTATE 30/500 ML
1-30 PLASTIC BAG, INJECTION (ML) INTRAVENOUS
Status: DISCONTINUED | OUTPATIENT
Start: 2023-01-16 | End: 2023-01-16

## 2023-01-16 RX ORDER — DOCUSATE SODIUM 100 MG/1
100 CAPSULE, LIQUID FILLED ORAL 2 TIMES DAILY
Status: DISCONTINUED | OUTPATIENT
Start: 2023-01-16 | End: 2023-01-19 | Stop reason: HOSPADM

## 2023-01-16 RX ORDER — CEFAZOLIN SODIUM 2 G/50ML
2000 SOLUTION INTRAVENOUS EVERY 8 HOURS
Status: DISCONTINUED | OUTPATIENT
Start: 2023-01-16 | End: 2023-01-16

## 2023-01-16 RX ORDER — ROPIVACAINE HYDROCHLORIDE 2 MG/ML
INJECTION, SOLUTION EPIDURAL; INFILTRATION; PERINEURAL AS NEEDED
Status: DISCONTINUED | OUTPATIENT
Start: 2023-01-16 | End: 2023-01-16 | Stop reason: HOSPADM

## 2023-01-16 RX ORDER — LIDOCAINE HYDROCHLORIDE AND EPINEPHRINE 15; 5 MG/ML; UG/ML
INJECTION, SOLUTION EPIDURAL AS NEEDED
Status: DISCONTINUED | OUTPATIENT
Start: 2023-01-16 | End: 2023-01-16

## 2023-01-16 RX ADMIN — HYDROCORTISONE 1 APPLICATION: 1 CREAM TOPICAL at 21:31

## 2023-01-16 RX ADMIN — SODIUM CHLORIDE, SODIUM LACTATE, POTASSIUM CHLORIDE, AND CALCIUM CHLORIDE 125 ML/HR: 600; 310; 30; 20 INJECTION, SOLUTION INTRAVENOUS at 06:49

## 2023-01-16 RX ADMIN — SODIUM CHLORIDE, SODIUM LACTATE, POTASSIUM CHLORIDE, AND CALCIUM CHLORIDE 125 ML/HR: 600; 310; 30; 20 INJECTION, SOLUTION INTRAVENOUS at 15:01

## 2023-01-16 RX ADMIN — LIDOCAINE HYDROCHLORIDE AND EPINEPHRINE 3 ML: 15; 5 INJECTION, SOLUTION EPIDURAL at 09:14

## 2023-01-16 RX ADMIN — ACETAMINOPHEN 650 MG: 325 TABLET ORAL at 21:30

## 2023-01-16 RX ADMIN — WITCH HAZEL 1 PAD: 500 SOLUTION RECTAL; TOPICAL at 21:31

## 2023-01-16 RX ADMIN — Medication 62.5 MILLI-UNITS/MIN: at 22:14

## 2023-01-16 RX ADMIN — ROPIVACAINE HYDROCHLORIDE: 2 INJECTION, SOLUTION EPIDURAL; INFILTRATION at 09:31

## 2023-01-16 RX ADMIN — BUTORPHANOL TARTRATE 1 MG: 1 INJECTION, SOLUTION INTRAMUSCULAR; INTRAVENOUS at 04:11

## 2023-01-16 RX ADMIN — ROPIVACAINE HYDROCHLORIDE 5 ML: 2 INJECTION, SOLUTION EPIDURAL; INFILTRATION at 09:18

## 2023-01-16 RX ADMIN — Medication 50 MCG: at 01:27

## 2023-01-16 RX ADMIN — LIDOCAINE HYDROCHLORIDE AND EPINEPHRINE 5 ML: 15; 5 INJECTION, SOLUTION EPIDURAL at 07:09

## 2023-01-16 RX ADMIN — ROPIVACAINE HYDROCHLORIDE: 2 INJECTION, SOLUTION EPIDURAL; INFILTRATION at 17:43

## 2023-01-16 RX ADMIN — ROPIVACAINE HYDROCHLORIDE 5 ML: 2 INJECTION, SOLUTION EPIDURAL; INFILTRATION at 09:21

## 2023-01-16 RX ADMIN — Medication 2 MILLI-UNITS/MIN: at 12:40

## 2023-01-16 RX ADMIN — IBUPROFEN 600 MG: 600 TABLET, FILM COATED ORAL at 21:30

## 2023-01-16 RX ADMIN — CEFAZOLIN SODIUM 2000 MG: 2 SOLUTION INTRAVENOUS at 14:01

## 2023-01-16 RX ADMIN — BENZOCAINE AND LEVOMENTHOL 1 APPLICATION: 200; 5 SPRAY TOPICAL at 21:31

## 2023-01-16 RX ADMIN — ROPIVACAINE HYDROCHLORIDE: 2 INJECTION, SOLUTION EPIDURAL; INFILTRATION at 07:27

## 2023-01-16 RX ADMIN — ROPIVACAINE HYDROCHLORIDE: 2 INJECTION, SOLUTION EPIDURAL; INFILTRATION at 12:42

## 2023-01-16 RX ADMIN — Medication 2 MILLI-UNITS/MIN: at 15:42

## 2023-01-16 RX ADMIN — LIDOCAINE HYDROCHLORIDE AND EPINEPHRINE 2 ML: 15; 5 INJECTION, SOLUTION EPIDURAL at 09:17

## 2023-01-16 RX ADMIN — LIDOCAINE HYDROCHLORIDE AND EPINEPHRINE 10 ML: 15; 5 INJECTION, SOLUTION EPIDURAL at 08:30

## 2023-01-16 RX ADMIN — AMPICILLIN SODIUM AND SULBACTAM SODIUM 3 G: 2; 1 INJECTION, POWDER, FOR SOLUTION INTRAMUSCULAR; INTRAVENOUS at 22:13

## 2023-01-16 RX ADMIN — ONDANSETRON 4 MG: 2 INJECTION INTRAMUSCULAR; INTRAVENOUS at 08:57

## 2023-01-16 RX ADMIN — LIDOCAINE HYDROCHLORIDE AND EPINEPHRINE 5 ML: 15; 5 INJECTION, SOLUTION EPIDURAL at 07:23

## 2023-01-16 RX ADMIN — VALACYCLOVIR HYDROCHLORIDE 500 MG: 500 TABLET, FILM COATED ORAL at 11:02

## 2023-01-16 RX ADMIN — DOCUSATE SODIUM 100 MG: 100 CAPSULE, LIQUID FILLED ORAL at 21:30

## 2023-01-16 RX ADMIN — OXYCODONE HYDROCHLORIDE 5 MG: 5 TABLET ORAL at 23:00

## 2023-01-16 NOTE — OB LABOR/OXYTOCIN SAFETY PROGRESS
Labor Progress Note - Margie Gardner Beams 23 y o  female MRN: [id number]    Unit/Bed#: -01 Encounter: [id number]       Contraction Frequency (minutes): 3 5-4  Contraction Quality: Mild  Tachysystole: No     Cervical Dilation: Fingertip   Cervical Effacement: 80  Fetal Station: -1  Baseline Rate: 120 bpm  Fetal Heart Rate: 138 BPM  FHR Category: Category I        Vital Signs:   Vitals:    01/16/23 0445   BP: 117/76   Pulse: 77   Resp:    Temp:    SpO2:        Notes/comments: Difficult to assess contractions due to patient movement;  Very uncomfortable with contractions;  S/p one dose of stadol with some relief; Offered epidural;  Would not be able to tolerate myers bulb placement;   Fetal status remains reassuring        Thuy Chen MD    1/16/2023 5:58 AM   OB Hospitalist

## 2023-01-16 NOTE — OB LABOR/OXYTOCIN SAFETY PROGRESS
Labor Progress Note - Margie Louie 23 y o  female MRN: [id number]    Unit/Bed#: -01 Encounter: [id number]    Dose (ruddy-units/min) Oxytocin: 2 ruddy-units/min  Contraction Frequency (minutes): 2-4  Contraction Quality: Mild  Tachysystole: No                    FHT 120s moderate variability + accels cat I  toco Q 2-4  cvx 4/90/-1  No membranes felt, suspect SROM at earlier stage    Vital Signs:   Vitals:    01/16/23 1249   BP: 126/71   Pulse:    Resp:    Temp:    SpO2:        Notes/comments: Notified that cervical catheter fell out  Cervical exam now 4 cm  Start pitocin augmentation   Dr Tae Gomez DO 1/16/2023 12:53 PM

## 2023-01-16 NOTE — OB LABOR/OXYTOCIN SAFETY PROGRESS
Labor Progress Note - Margie Buckley 23 y o  female MRN: [id number]    Unit/Bed#: -01 Encounter: [id number]       Contraction Frequency (minutes): 1-5  Contraction Quality: Mild  Tachysystole: No   Cervical Dilation: Fingertip        Cervical Effacement: 80  Fetal Station: -1  Baseline Rate: 130 bpm  Fetal Heart Rate: 142 BPM  FHR Category: Category I               Vital Signs:   Vitals:    01/16/23 0844   BP: 124/61   Pulse: (!) 110   Resp:    Temp:    SpO2:        Notes/comments: Becoming more comfortable with epidural  Cervical ripening balloon placed  Will assess contraction pattern and add either pitocin or repeat cytotec as needed          Savannah Purcell MD 1/16/2023 10:24 AM

## 2023-01-16 NOTE — ANESTHESIA PROCEDURE NOTES
Epidural Block    Patient location during procedure: OB  Start time: 1/16/2023 7:09 AM  Reason for block: procedure for pain, at surgeon's request and primary anesthetic  Staffing  Performed: Anesthesiologist   Anesthesiologist: Jen Lindsay MD  Preanesthetic Checklist  Completed: patient identified, IV checked, site marked, risks and benefits discussed, surgical consent, monitors and equipment checked, pre-op evaluation and timeout performed  Epidural  Patient position: sitting  Prep: Betadine  Patient monitoring: heart rate, cardiac monitor, continuous pulse ox and frequent blood pressure checks  Approach: midline  Location: lumbar  Injection technique: TOREY air  Needle  Needle type: Tuohy   Needle gauge: 18 G  Catheter at skin depth: 13 cm  Catheter securement method: stabilization device (statlock, tegaderm, two inch tape)  Test dose: negative  Assessment  Sensory level: T10  Number of attempts: 1negative aspiration for CSF, negative aspiration for heme and no paresthesia on injection  patient tolerated the procedure well with no immediate complications

## 2023-01-16 NOTE — ANESTHESIA PREPROCEDURE EVALUATION
Procedure:  LABOR ANALGESIA    Relevant Problems   GYN   (+) 39 weeks gestation of pregnancy      NEURO/PSYCH   (+) History of drug use   (+) History of herpes genitalis             Anesthesia Plan  ASA Score- 2     Anesthesia Type- epidural with ASA Monitors  Additional Monitors:   Airway Plan:           Plan Factors-    Chart reviewed  Existing labs reviewed  Patient summary reviewed  Patient is not a current smoker  Patient did not smoke on day of surgery  Induction-     Postoperative Plan-     Informed Consent- Anesthetic plan and risks discussed with patient

## 2023-01-16 NOTE — OB LABOR/OXYTOCIN SAFETY PROGRESS
Labor Progress Note - Margie Gaffney 23 y o  female MRN: [id number]    Unit/Bed#: -01 Encounter: [id number]       Contraction Frequency (minutes): 3 5-4  Contraction Quality: Mild  Tachysystole: No   Cervical Dilation: Closed   Cervical Effacement: 70  Fetal Station: -2  Baseline Rate: 135 bpm  Fetal Heart Rate: 138 BPM  FHR Category: Category I     Vital Signs:   Vitals:    01/16/23 0015   BP: 106/72   Pulse: 81   Resp:    Temp:    SpO2:        Notes/comments: Feeling mild contractions - not yet uncomfortable;  Cervix closed and not amenable to myers bulb placement; Will give another dose of misoprostol 50mcg po and reassess in 4 hours;   Fetal status remains reassuring        Lynne Montemayor MD    1/16/2023 1:24 AM   OB Hospitalist

## 2023-01-16 NOTE — OB LABOR/OXYTOCIN SAFETY PROGRESS
Labor Progress Note - Margie Faye Lyndhurst 23 y o  female MRN: [id number]    Unit/Bed#:  206-01 Encounter: [id number]       Contraction Frequency (minutes): 2-4  Contraction Quality: Mild  Tachysystole: No   Cervical Dilation: Fingertip        Cervical Effacement: 80  Fetal Station: -1  Baseline Rate: 130 bpm  Fetal Heart Rate: 140 BPM  FHR Category: Category I               Vital Signs:   Vitals:    01/16/23 1136   BP: 117/74   Pulse: 86   Resp:    Temp:    SpO2:        Notes/comments: Pt is s/p cervical  Wise catheter placement by Dr Karthikeyan Nicolas for cervical ripening at 10:30 am   Pt is comfortable with epidural   Contractions Q 2-4 minutes  Will add low dose pitocin for cervical ripening           Yoko Gallardo DO 1/16/2023 12:20 PM

## 2023-01-16 NOTE — ANESTHESIA PROCEDURE NOTES
Epidural Block    Patient location during procedure: OB  Start time: 1/16/2023 9:14 AM  Reason for block: procedure for pain and at surgeon's request  Staffing  Performed: Anesthesiologist   Anesthesiologist: Mary Wood MD  Preanesthetic Checklist  Completed: patient identified, IV checked, risks and benefits discussed, surgical consent, monitors and equipment checked, pre-op evaluation and timeout performed  Epidural  Patient position: sitting  Prep: ChloraPrep and site prepped and draped  Patient monitoring: heart rate, continuous pulse ox and frequent blood pressure checks  Approach: midline  Location: lumbar  Injection technique: TOREY saline  Needle  Needle type: Tuohy   Needle gauge: 17 G  Catheter type: side hole  Catheter size: 19 G  Catheter at skin depth: 11 5 cm  Catheter securement method: clear occlusive dressing  Test dose: negative  Assessment  Number of attempts: 1negative aspiration for heme, negative aspiration for CSF and no paresthesia on injection  patient tolerated the procedure well with no immediate complications  Additional Notes  Pt positioned sitting, prior catheter removed, timeout, sterile prep and drape  Placement x 1 attempt at L 4/5 with TOREY to NS   1 level lower and significantly more midline than prior placement  Cath threaded easily, negative aspiration and test, catheter dosed incrementally  Patient laid supine with AMY, PCEA initiated, + relief

## 2023-01-16 NOTE — PLAN OF CARE
Problem: BIRTH - VAGINAL/ SECTION  Goal: Fetal and maternal status remain reassuring during the birth process  Description: INTERVENTIONS:  - Monitor vital signs  - Monitor fetal heart rate  - Monitor uterine activity  - Monitor labor progression (vaginal delivery)  - DVT prophylaxis  - Antibiotic prophylaxis  Outcome: Progressing  Goal: Emotionally satisfying birthing experience for mother/fetus  Description: Interventions:  - Assess, plan, implement and evaluate the nursing care given to the patient in labor  - Advocate the philosophy that each childbirth experience is a unique experience and support the family's chosen level of involvement and control during the labor process   - Actively participate in both the patient's and family's teaching of the birth process  - Consider cultural, Islam and age-specific factors and plan care for the patient in labor  Outcome: Progressing     Problem: Knowledge Deficit  Goal: Verbalizes understanding of labor plan  Description: Assess patient/family/caregiver's baseline knowledge level and ability to understand information  Provide education via patient/family/caregiver's preferred learning method at appropriate level of understanding  1  Provide teaching at level of understanding  2  Provide teaching via preferred learning method(s)  Outcome: Progressing     Problem: Labor & Delivery  Goal: Manages discomfort  Description: Assess and monitor for signs and symptoms of discomfort  Assess patient's pain level regularly and per hospital policy  Administer medications as ordered  Support use of nonpharmacological methods to help control pain such as distraction, imagery, relaxation, and application of heat and cold  Collaborate with interdisciplinary team and patient to determine appropriate pain management plan  1  Include patient in decisions related to comfort  2  Offer non-pharmacological pain management interventions    3  Report ineffective pain management to physician  Outcome: Progressing  Goal: Patient vital signs are stable  Description: 1  Assess vital signs - vaginal delivery    Outcome: Progressing

## 2023-01-16 NOTE — PROGRESS NOTES
Due to suspected prolonged ruptured membranes, will start antibiotics per Dr Jair Humphreys request   Ancef 2 grams Q 8 hours

## 2023-01-16 NOTE — OB LABOR/OXYTOCIN SAFETY PROGRESS
Labor Progress Note - Margie Gaffney 23 y o  female MRN: [id number]    Unit/Bed#: -01 Encounter: [id number]       Contraction Frequency (minutes): 1-5  Contraction Quality: Mild  Tachysystole: No   Cervical Dilation: Fingertip        Cervical Effacement: 80  Fetal Station: -1  Baseline Rate: 130 bpm  Fetal Heart Rate: 142 BPM  FHR Category: Category I               Vital Signs:   Vitals:    01/16/23 0844   BP: 124/61   Pulse: (!) 110   Resp:    Temp:    SpO2:        Notes/comments: Pt with painful contractions s/p epidural   Anesthesia notified  Cervical exam unchanged, fingertip  Dr Sammi Davis aware    Will plan to attempt myers for cervical ripening once more comfortable with epidural          Patsy Caballero DO 1/16/2023 8:49 AM

## 2023-01-16 NOTE — OB LABOR/OXYTOCIN SAFETY PROGRESS
Oxytocin Safety Progress Check Note - Margie Bang Rm 23 y o  female MRN: [id number]    Unit/Bed#: -01 Encounter: [id number]    Dose (ruddy-units/min) Oxytocin: 4 ruddy-units/min (ok to decrease to 4 per dr mace at bedside since pt making good cervical change)  Contraction Frequency (minutes): 2-4  Contraction Quality: Moderate  Tachysystole: No   Cervical Dilation: 8        Cervical Effacement: 90  Fetal Station: -1  Baseline Rate: 135 bpm  Fetal Heart Rate: 122 BPM  FHR Category: Category I               Vital Signs:   Vitals:    01/16/23 1734   BP: 127/68   Pulse:    Resp:    Temp: 98 5 °F (36 9 °C)   SpO2:        Notes/comments: Pt comfortable  Cervix 8 cm  Pit @ 4  Continue labor, Dr Hoa Iverson aware        Wen Mcgovern, DO 1/16/2023 5:37 PM

## 2023-01-16 NOTE — OB LABOR/OXYTOCIN SAFETY PROGRESS
Labor Progress Note - Margie Choi Voss 23 y o  female MRN: [id number]    Unit/Bed#: -01 Encounter: [id number]       Contraction Frequency (minutes): 4-5  Contraction Quality: Mild  Tachysystole: No   Cervical Dilation: Closed   Cervical Effacement: 50  Fetal Station: -2  Baseline Rate: 140 bpm  Fetal Heart Rate: 138 BPM  FHR Category: Category I     Vital Signs:   Vitals:    01/15/23 2100   BP: 111/76   Pulse: 85   Resp:    Temp:    SpO2:        Notes/comments: Patient feeling very mild crampiness - cervix remains unchanged;   Will give misoprostol 50mcg oral x 1 and reassess in 4 hours or prn        Erma David MD    1/15/2023 9:09 PM   OB Hospitalist

## 2023-01-17 LAB
ERYTHROCYTE [DISTWIDTH] IN BLOOD BY AUTOMATED COUNT: 14.6 % (ref 11.6–15.1)
HCT VFR BLD AUTO: 33.7 % (ref 34.8–46.1)
HGB BLD-MCNC: 10.7 G/DL (ref 11.5–15.4)
MCH RBC QN AUTO: 27.6 PG (ref 26.8–34.3)
MCHC RBC AUTO-ENTMCNC: 31.8 G/DL (ref 31.4–37.4)
MCV RBC AUTO: 87 FL (ref 82–98)
PLATELET # BLD AUTO: 205 THOUSANDS/UL (ref 149–390)
PMV BLD AUTO: 11.4 FL (ref 8.9–12.7)
RBC # BLD AUTO: 3.88 MILLION/UL (ref 3.81–5.12)
WBC # BLD AUTO: 18.53 THOUSAND/UL (ref 4.31–10.16)

## 2023-01-17 RX ADMIN — DOCUSATE SODIUM 100 MG: 100 CAPSULE, LIQUID FILLED ORAL at 08:00

## 2023-01-17 RX ADMIN — IBUPROFEN 600 MG: 600 TABLET, FILM COATED ORAL at 16:02

## 2023-01-17 RX ADMIN — AMPICILLIN SODIUM AND SULBACTAM SODIUM 3 G: 2; 1 INJECTION, POWDER, FOR SOLUTION INTRAMUSCULAR; INTRAVENOUS at 16:02

## 2023-01-17 RX ADMIN — IBUPROFEN 600 MG: 600 TABLET, FILM COATED ORAL at 21:56

## 2023-01-17 RX ADMIN — AMPICILLIN SODIUM AND SULBACTAM SODIUM 3 G: 2; 1 INJECTION, POWDER, FOR SOLUTION INTRAMUSCULAR; INTRAVENOUS at 09:35

## 2023-01-17 RX ADMIN — IBUPROFEN 600 MG: 600 TABLET, FILM COATED ORAL at 03:28

## 2023-01-17 RX ADMIN — ACETAMINOPHEN 650 MG: 325 TABLET ORAL at 01:33

## 2023-01-17 RX ADMIN — ACETAMINOPHEN 650 MG: 325 TABLET ORAL at 07:59

## 2023-01-17 RX ADMIN — DOCUSATE SODIUM 100 MG: 100 CAPSULE, LIQUID FILLED ORAL at 18:08

## 2023-01-17 RX ADMIN — IBUPROFEN 600 MG: 600 TABLET, FILM COATED ORAL at 09:35

## 2023-01-17 RX ADMIN — AMPICILLIN SODIUM AND SULBACTAM SODIUM 3 G: 2; 1 INJECTION, POWDER, FOR SOLUTION INTRAMUSCULAR; INTRAVENOUS at 03:28

## 2023-01-17 NOTE — L&D DELIVERY NOTE
Patient was found to be completely dilated and +1 station   She pushed for 61 minutes to spontaneously deliver a liveborn male infant in OA position through clear fluid (terminal meconium noted) at 2020, weight pending, APGARS 8 and 9 at 1 and 5 minutes, respectively  The head and shoulders delivered easily, with the body easily delivering thereafter  The infant was placed on maternal abdomen  Cord clamping was delayed for 30 seconds, after which the cord was doubly clamped and cut  Venous cord gas was collected  Cord blood was collected  Pitocin was started for active management of the 3rd stage  Placenta delivered spontaneously thereafter  Bimanual exam was performed, and the fundus was noted to be firm  A first degree laceration was noted and repaired with 3-0 Vicryl rapide suture  Excellent hemostasis was noted, with total EBL of 248 mL  ROM is unclear  She presented in the early AM of 1/15/23 but was not found to have ruptured membranes and was again evaluated later in the day  When the cervical ripening balloon was placed earlier today, no amniotic fluid was noted to be leaking  Membranes may have ruptured while the balloon was in place, but as a precaution she was given antibiotics when she was 24 hours from her first complaint of leaking fluid      Jose F Sheppard MD  1/16/2023 8:36 PM

## 2023-01-17 NOTE — OB LABOR/OXYTOCIN SAFETY PROGRESS
Labor Progress Note - Margie Garcia 23 y o  female MRN: [id number]    Unit/Bed#: -01 Encounter: [id number]    Dose (ruddy-units/min) Oxytocin: 4 ruddy-units/min Contraction Frequency (minutes): 2-4  Contraction Quality: Strong  Tachysystole: No   Cervical Dilation: 10  Dilation Complete Date: 01/16/23  Dilation Complete Time: 1912  Cervical Effacement: 100  Fetal Station: +1  Baseline Rate: 140 bpm  Fetal Heart Rate: 122 BPM  FHR Category: Category I               Vital Signs:   Vitals:    01/16/23 1852   BP: 126/84   Pulse: (!) 127   Resp:    Temp:    SpO2:        Notes/comments: Fully dilated, +1, MANNY  Comfortable with epidural  Will begin pushing   Karthikeyan Robison MD 1/16/2023 7:18 PM

## 2023-01-17 NOTE — ANESTHESIA POSTPROCEDURE EVALUATION
Post-Op Assessment Note    CV Status:  Stable  Pain Score: 0    Pain management: adequate     Mental Status:  Alert   Hydration Status:  Stable   PONV Controlled:  None   Airway Patency:  Patent      Post Op Vitals Reviewed: Yes      Staff: Anesthesiologist     Post-op block assessment: adhesive bandage applied, site cleaned, catheter intact and no complications      No notable events documented  BP      Temp      Pulse    Resp      SpO2      Moving lower limbs, reports bilat  tingling in lower limbs

## 2023-01-17 NOTE — OB LABOR/OXYTOCIN SAFETY PROGRESS
Labor Progress Note - Margie Pena 23 y o  female MRN: [id number]    Unit/Bed#: -01 Encounter: [id number]    Dose (ruddy-units/min) Oxytocin: 4 ruddy-units/min Contraction Frequency (minutes): 2-4  Contraction Quality: Strong  Tachysystole: No   Cervical Dilation: 10  Dilation Complete Date: 01/16/23  Dilation Complete Time: 1912  Cervical Effacement: 100  Fetal Station: +3  Baseline Rate: 140 bpm  Fetal Heart Rate: 122 BPM  FHR Category: Category II               Vital Signs:   Vitals:    01/16/23 1852   BP: 126/84   Pulse: (!) 127   Resp:    Temp:    SpO2:        Notes/comments: Variable decelerations with pushing  Moderate variability and accelerations noted  Making excellent progress  Anticipate SAVD sara Culver MD 1/16/2023 7:55 PM

## 2023-01-17 NOTE — ASSESSMENT & PLAN NOTE
- Likely secondary to intraamniotic infection/inflammation intrapartum, give onset of fever shortly following delivery  Patient is clinically and hemodynamically stable without evidence of endometritis, sepsis, or complicated infection  TMax 102 6 at 21:00 on 1/16  Afebrile since 21:45 1/16   - Given fever occurring following delivery, patient was started on Unasyn 3g IV q6h  Has 24hrs IV antibiotics and still afebrile  - Repeat CBC this AM is decreasing

## 2023-01-17 NOTE — PLAN OF CARE
Problem: POSTPARTUM  Goal: Experiences normal postpartum course  Description: INTERVENTIONS:  - Monitor maternal vital signs  - Assess uterine involution and lochia  Outcome: Progressing  Goal: Appropriate maternal -  bonding  Description: INTERVENTIONS:  - Identify family support  - Assess for appropriate maternal/infant bonding   -Encourage maternal/infant bonding opportunities  - Referral to  or  as needed  Outcome: Progressing  Goal: Establishment of infant feeding pattern  Description: INTERVENTIONS:  - Assess breast/bottle feeding  - Refer to lactation as needed  Outcome: Progressing  Goal: Incision(s), wounds(s) or drain site(s) healing without S/S of infection  Description: INTERVENTIONS  - Assess and document dressing, incision, wound bed, drain sites and surrounding tissue  - Provide patient and family education  - Perform skin care/dressing changes every   Outcome: Progressing     Problem: PAIN - ADULT  Goal: Verbalizes/displays adequate comfort level or baseline comfort level  Description: Interventions:  - Encourage patient to monitor pain and request assistance  - Assess pain using appropriate pain scale  - Administer analgesics based on type and severity of pain and evaluate response  - Implement non-pharmacological measures as appropriate and evaluate response  - Consider cultural and social influences on pain and pain management  - Notify physician/advanced practitioner if interventions unsuccessful or patient reports new pain  Outcome: Progressing     Problem: INFECTION - ADULT  Goal: Absence or prevention of progression during hospitalization  Description: INTERVENTIONS:  - Assess and monitor for signs and symptoms of infection  - Monitor lab/diagnostic results  - Monitor all insertion sites, i e  indwelling lines, tubes, and drains  - Monitor endotracheal if appropriate and nasal secretions for changes in amount and color  - [place] appropriate cooling/warming therapies per order  - Administer medications as ordered  - Instruct and encourage patient and family to use good hand hygiene technique  - Identify and instruct in appropriate isolation precautions for identified infection/condition  Outcome: Progressing  Goal: Absence of fever/infection during neutropenic period  Description: INTERVENTIONS:  - Monitor WBC    Outcome: Progressing     Problem: SAFETY ADULT  Goal: Patient will remain free of falls  Description: INTERVENTIONS:  - Educate patient/family on patient safety including physical limitations  - Instruct patient to call for assistance with activity   - Consult OT/PT to assist with strengthening/mobility   - Keep Call bell within reach  - Keep bed low and locked with side rails adjusted as appropriate  - Keep care items and personal belongings within reach  - Initiate and maintain comfort rounds  - Make Fall Risk Sign visible to staff  - Offer Toileting every  Hours, in advance of need  - Initiate/Maintain alarm  - Obtain necessary fall risk management equipment:   - Apply yellow socks and bracelet for high fall risk patients  - Consider moving patient to room near nurses station  Outcome: Progressing  Goal: Maintain or return to baseline ADL function  Description: INTERVENTIONS:  -  Assess patient's ability to carry out ADLs; assess patient's baseline for ADL function and identify physical deficits which impact ability to perform ADLs (bathing, care of mouth/teeth, toileting, grooming, dressing, etc )  - Assess/evaluate cause of self-care deficits   - Assess range of motion  - Assess patient's mobility; develop plan if impaired  - Assess patient's need for assistive devices and provide as appropriate  - Encourage maximum independence but intervene and supervise when necessary  - Involve family in performance of ADLs  - Assess for home care needs following discharge   - Consider OT consult to assist with ADL evaluation and planning for discharge  - Provide patient education as appropriate  Outcome: Progressing  Goal: Maintains/Returns to pre admission functional level  Description: INTERVENTIONS:  - Perform BMAT or MOVE assessment daily    - Set and communicate daily mobility goal to care team and patient/family/caregiver  - Collaborate with rehabilitation services on mobility goals if consulted  - Perform Range of Motion  times a day  - Reposition patient every  hours    - Dangle patient  times a day  - Stand patient  times a day  - Ambulate patient  times a day  - Out of bed to chair  times a day   - Out of bed for meals  times a day  - Out of bed for toileting  - Record patient progress and toleration of activity level   Outcome: Progressing     Problem: DISCHARGE PLANNING  Goal: Discharge to home or other facility with appropriate resources  Description: INTERVENTIONS:  - Identify barriers to discharge w/patient and caregiver  - Arrange for needed discharge resources and transportation as appropriate  - Identify discharge learning needs (meds, wound care, etc )  - Arrange for interpretive services to assist at discharge as needed  - Refer to Case Management Department for coordinating discharge planning if the patient needs post-hospital services based on physician/advanced practitioner order or complex needs related to functional status, cognitive ability, or social support system  Outcome: Progressing

## 2023-01-17 NOTE — PROGRESS NOTES
Progress Note - OB/GYN  Post-Partum Physician Note   Margie Simon Singh 23 y o  female MRN: [id number]  Unit/Bed#: -01 Encounter: [id number]    Assessment:  23y o  year-old , postpartum day #1 s/p     Plan:   Continue routine postpartum care  Encourage ambulation    Postpartum fever  - Likely secondary to intraamniotic infection/inflammation intrapartum, give onset of fever shortly following delivery  Patient is clinically and hemodynamically stable without evidence of endometritis, sepsis, or complicated infection  TMax 102 6 at 21:00 on   Afebrile since 21:45    - Given fever occurring following delivery, patient was started on Unasyn 3g IV q6h  Has received two doses so far  Will continue x 24 hours, as previously ordered  - Plan to repeat CBC in AM to trend leukocytosis         _________________________________    Subjective:   Pain: Well controlled  Tolerating Oral Intake: Yes  Voiding: Yes  Ambulating: Yes  Breastfeeding: Yes  Chest Pain: No  Shortness of Breath: No  Leg Pain/Discomfort: No  Lochia: Normal    Objective:   Vitals:    23 2304 23 0000 23 0328 23 0700   BP: 119/86   131/79   BP Location:    Right arm   Pulse: 84   76   Resp:    16   Temp:  98 6 °F (37 °C) 97 8 °F (36 6 °C) 97 7 °F (36 5 °C)   TempSrc:  Oral Oral Oral   SpO2:    95%   Weight:       Height:           Intake/Output Summary (Last 24 hours) at 2023 1300  Last data filed at 2023 0600  Gross per 24 hour   Intake 1000 ml   Output 1698 ml   Net -698 ml       Physical Exam:  General: in no apparent distress, well developed and well nourished, alert, oriented times 3, afebrile and normal vitals  Abdomen: abdomen is soft without significant tenderness, masses, organomegaly or guarding  Fundus: Firm and non-tender, 1 below the umbilicus  Lower extremeties: non-tender, no edema    Labs/Tests:   Lab Results   Component Value Date/Time    HGB 10 7 (L) 2023 06:28 AM    HGB 12 8 01/15/2023 03:21 PM     01/17/2023 06:28 AM     01/15/2023 03:21 PM    WBC 18 53 (H) 01/17/2023 06:28 AM    WBC 9 89 01/15/2023 03:21 PM        Brief OB Lab review:  ABO Grouping   Date Value Ref Range Status   01/15/2023 O  Final      Rh Factor   Date Value Ref Range Status   01/15/2023 Positive  Final    No results found for: ANTIBODYSCR  No results found for: RUBM    MEDS:   Current Facility-Administered Medications   Medication Dose Route Frequency   • acetaminophen (TYLENOL) tablet 650 mg  650 mg Oral Q4H PRN   • ampicillin-sulbactam (UNASYN) 3 g in sodium chloride 0 9 % 100 mL IVPB  3 g Intravenous Q6H   • benzocaine-menthol-lanolin-aloe (DERMOPLAST) 20-0 5 % topical spray 1 application  1 application Topical B3F PRN   • docusate sodium (COLACE) capsule 100 mg  100 mg Oral BID   • hydrocortisone 1 % cream 1 application  1 application Topical Daily PRN   • ibuprofen (MOTRIN) tablet 600 mg  600 mg Oral Q6H   • oxyCODONE (ROXICODONE) IR tablet 5 mg  5 mg Oral Q4H PRN   • oxytocin (PITOCIN) 30 Units in lactated ringers 500 mL infusion  250 ruddy-units/min Intravenous Once   • oxytocin (PITOCIN) 30 Units in lactated ringers 500 mL infusion  62 5 ruddy-units/min Intravenous Continuous   • simethicone (MYLICON) chewable tablet 80 mg  80 mg Oral 4x Daily PRN   • witch hazel-glycerin (TUCKS) topical pad 1 pad  1 pad Topical Q4H PRN     Invasive Devices     Peripheral Intravenous Line  Duration           Peripheral IV 01/15/23 Right;Ventral (anterior) Forearm 1 day                  Otf Farah MD  1/17/2023 1:00 PM

## 2023-01-17 NOTE — PLAN OF CARE
Problem: POSTPARTUM  Goal: Experiences normal postpartum course  Description: INTERVENTIONS:  - Monitor maternal vital signs  - Assess uterine involution and lochia  Outcome: Progressing  Goal: Appropriate maternal -  bonding  Description: INTERVENTIONS:  - Identify family support  - Assess for appropriate maternal/infant bonding   -Encourage maternal/infant bonding opportunities  - Referral to  or  as needed  Outcome: Progressing  Goal: Establishment of infant feeding pattern  Description: INTERVENTIONS:  - Assess breast/bottle feeding  - Refer to lactation as needed  Outcome: Progressing  Goal: Incision(s), wounds(s) or drain site(s) healing without S/S of infection  Description: INTERVENTIONS  - Assess and document dressing, incision, wound bed, drain sites and surrounding tissue  - Provide patient and family education  Outcome: Progressing     Problem: PAIN - ADULT  Goal: Verbalizes/displays adequate comfort level or baseline comfort level  Description: Interventions:  - Encourage patient to monitor pain and request assistance  - Assess pain using appropriate pain scale  - Administer analgesics based on type and severity of pain and evaluate response  - Implement non-pharmacological measures as appropriate and evaluate response  - Consider cultural and social influences on pain and pain management  - Notify physician/advanced practitioner if interventions unsuccessful or patient reports new pain  Outcome: Progressing     Problem: INFECTION - ADULT  Goal: Absence or prevention of progression during hospitalization  Description: INTERVENTIONS:  - Assess and monitor for signs and symptoms of infection  - Monitor lab/diagnostic results  - Monitor all insertion sites, i e  indwelling lines, tubes, and drains  - Monitor endotracheal if appropriate and nasal secretions for changes in amount and color  - [place] appropriate cooling/warming therapies per order  - Administer medications as ordered  - Instruct and encourage patient and family to use good hand hygiene technique  - Identify and instruct in appropriate isolation precautions for identified infection/condition  Outcome: Progressing  Goal: Absence of fever/infection during neutropenic period  Description: INTERVENTIONS:  - Monitor WBC    Outcome: Progressing     Problem: SAFETY ADULT  Goal: Patient will remain free of falls  Description: INTERVENTIONS:  - Educate patient/family on patient safety including physical limitations  - Instruct patient to call for assistance with activity   - Consult OT/PT to assist with strengthening/mobility   - Keep Call bell within reach  - Keep bed low and locked with side rails adjusted as appropriate  - Keep care items and personal belongings within reach  - Initiate and maintain comfort rounds  - Make Fall Risk Sign visible to staff  - Apply yellow socks and bracelet for high fall risk patients  - Consider moving patient to room near nurses station  Outcome: Progressing  Goal: Maintain or return to baseline ADL function  Description: INTERVENTIONS:  -  Assess patient's ability to carry out ADLs; assess patient's baseline for ADL function and identify physical deficits which impact ability to perform ADLs (bathing, care of mouth/teeth, toileting, grooming, dressing, etc )  - Assess/evaluate cause of self-care deficits   - Assess range of motion  - Assess patient's mobility; develop plan if impaired  - Assess patient's need for assistive devices and provide as appropriate  - Encourage maximum independence but intervene and supervise when necessary  - Involve family in performance of ADLs  - Assess for home care needs following discharge   - Consider OT consult to assist with ADL evaluation and planning for discharge  - Provide patient education as appropriate  Outcome: Progressing  Goal: Maintains/Returns to pre admission functional level  Description: INTERVENTIONS:  - Perform BMAT or MOVE assessment daily    - Set and communicate daily mobility goal to care team and patient/family/caregiver     - Collaborate with rehabilitation services on mobility goals if consulted  - Out of bed for toileting  - Record patient progress and toleration of activity level   Outcome: Progressing     Problem: DISCHARGE PLANNING  Goal: Discharge to home or other facility with appropriate resources  Description: INTERVENTIONS:  - Identify barriers to discharge w/patient and caregiver  - Arrange for needed discharge resources and transportation as appropriate  - Identify discharge learning needs (meds, wound care, etc )  - Arrange for interpretive services to assist at discharge as needed  - Refer to Case Management Department for coordinating discharge planning if the patient needs post-hospital services based on physician/advanced practitioner order or complex needs related to functional status, cognitive ability, or social support system  Outcome: Progressing

## 2023-01-17 NOTE — LACTATION NOTE
This note was copied from a baby's chart  CONSULT - LACTATION  Baby Boy (Margie) Doe Owusu 1 days male MRN: [id number]    [facility] Room / Bed: (N)/(N) Encounter: [id number]    Maternal Information     MOTHER:  Briseyda Johns  Maternal Age: 23 y o    OB History: # 1 - Date: 23, Sex: Male, Weight: 3620 g (7 lb 15 7 oz), GA: 40w0d, Delivery: Vaginal, Spontaneous, Apgar1: 8, Apgar5: 9, Living: Living, Birth Comments: None   Previouse breast reduction surgery? No    Lactation history:   Has patient previously breast fed: No   How long had patient previously breast fed:     Previous breast feeding complications:     No past surgical history on file  Birth information:  YOB: 2021   Time of birth: 8:20 PM   Sex: male   Delivery type: Vaginal, Spontaneous   Birth Weight: 3620 g (7 lb 15 7 oz)   Percent of Weight Change: 0%     Gestational Age: 37w0d   [unfilled]    Assessment     Breast and nipple assessment: normal appearance with large, long nipples  [place] on the right side has blisters and sore, tender to touch    [place] Assessment: baby was initially not opening wide, chomping on finger during suck training, after a few minutes opened wider, more coordinated suckle with occassional chomp  Mother reported comfort    Feeding assessment: feeding well  LATCH:  Latch: Grasps breast, tongue down, lips flanged, rhythmic sucking   Audible Swallowing: Spontaneous and intermittent (24 hours old)   Type of Nipple: Everted (After stimulation)   Comfort (Breast/Nipple): Filling, red/small blisters/bruises, mild/moderate discomfort   Hold (Positioning): Partial assist, teach one side, mother does other, staff holds   [facility] Score: 8          Feeding recommendations:  breast feed on demand     Met with parents to discuss feeding plan   Mother is planning on breastfeeding, but discussed that if supplementation is necessary, she will provide  The Ready, Set, Baby Booklet was discussed  Discussed importance of skin to skin to help baby awaken for breastfeeding, to help with milk production as well as stabilize temperature, blood sugars, decrease pain, promote relaxation, and calm the baby as well as for bonding that father may do as well  Showed images of tummy size progression as milk production increases to meet the nutritional/growing needs of the baby and risks associated with introducing early supplementation that is not medically indicated  Discussed alternative feeding methods as a manner to provide baby with additional colostrum/breast milk if baby is sleepy and/or unable to breastfeed directly to help protect the milk supply and preserve latching abilities at the breast     Discussed “Second Night Syndrome” explaining how baby’s cluster feeds to meet growing needs  Growth spurts were explained and how cluster feeding helps boost milk supply  Explained feeding cues and fullness cues as well as importance of obtaining a deep latch for effective milk removal and proper positioning (tummy to tummy, at level, nose to nipple, bring chin to breast first and bringing baby to breast) with ear, shoulder, and hip alignment  Demonstrated on breast model how to hold, compress and perform hand expression  Baby was beginning to cue in crib, un-swaddled and given to mother in a cross cradle  Mother has been using cradle hold, but was encouraged to begin in a cross cradle to get a deep latch and then cradle, once baby is suckling well  Initial latch gave a pinch feeling  Suck training was completed with exercises to help coordinate and have him open wider  Baby received 5 drops of colostrum and latched deeply, demonstrated strong jaw movements and pauses  Mother was show how to use breast compression to help keep constant flow  Nipple care was discussed for her right nipple that blistered due to a 1 hour feeding   Encouraged mother to break latch (practiced and demonstrated) at 30 minutes, if baby is still on, and then to burp and latch on other side (switch nursing)  Addressed breast pump needs and mother discussed that she will be receiving a breast pump at home that was sent from insurance  Parents were made aware of how to communicate with lactation and encouraged to reach out for continued support and/or questions that arise  Primary RN made aware of how to support mother        Clyde Lawrence RN 1/17/2023 10:19 AM

## 2023-01-17 NOTE — CASE MANAGEMENT
Consult(s): Hx of drug use THC in last 2 years     CM met w/MOB who provided the following information:      · Baby's name/gender: Cherylene Mort    · Mother of baby: Yo Ahumada "Margie" Sohan Gains   · Father of baby//SO: Evaristo Norwood   · Other Legal Guardian(s) for baby: none    · Alternate emergency contact: none other than contacts listed in the chart   · Other children: none   · Lives with: Francisco Buchanans Mother, Step Father and Uncle   · Support System: FOB's family, MOB's father and mother   · Baby Supplies: Yes, we have everything   · Bottle or Breast Feeding: Breast feeding unless both is needed   · Breast Pump if breast feeding: Ordered through insurance  Not stork pump  Will confirm child is born and insurance will deliver at home   · Government Assistance Programs/WIC/EBT/SSI: SNAP/EBT     · Work/School:  none  · Transportation: yes   · Prenatal care:   Was at [employer]    · Pediatrician:  Not confirmed but thinking of going to  in [facility]   · [address] Hx or Treatment:  NA   · Substance Abuse: Never in treatment  Had admitted to [facility] use in last 2 years and had +THC in June 2022  Margie is aware of the risks of THC for herself and baby  No further discussion needed   · Hx DV/IPV: NA   · Legal (probation/parole/incarceration): NA   · Community Referrals/C&Y/NFP:  NA  · Insurance for baby: CM discussed with MOB and FOB to add baby to the insurance within 30 days      MOB denies any other CM needs at this time  Encouraged family to contact CM as needed

## 2023-01-18 LAB
BASOPHILS # BLD AUTO: 0.05 THOUSANDS/ÂΜL (ref 0–0.1)
BASOPHILS NFR BLD AUTO: 0 % (ref 0–1)
EOSINOPHIL # BLD AUTO: 0.11 THOUSAND/ÂΜL (ref 0–0.61)
EOSINOPHIL NFR BLD AUTO: 1 % (ref 0–6)
ERYTHROCYTE [DISTWIDTH] IN BLOOD BY AUTOMATED COUNT: 14.7 % (ref 11.6–15.1)
HCT VFR BLD AUTO: 32.2 % (ref 34.8–46.1)
HGB BLD-MCNC: 10.3 G/DL (ref 11.5–15.4)
IMM GRANULOCYTES # BLD AUTO: 0.08 THOUSAND/UL (ref 0–0.2)
IMM GRANULOCYTES NFR BLD AUTO: 1 % (ref 0–2)
LYMPHOCYTES # BLD AUTO: 3.06 THOUSANDS/ÂΜL (ref 0.6–4.47)
LYMPHOCYTES NFR BLD AUTO: 25 % (ref 14–44)
MCH RBC QN AUTO: 26.8 PG (ref 26.8–34.3)
MCHC RBC AUTO-ENTMCNC: 32 G/DL (ref 31.4–37.4)
MCV RBC AUTO: 84 FL (ref 82–98)
MONOCYTES # BLD AUTO: 0.75 THOUSAND/ÂΜL (ref 0.17–1.22)
MONOCYTES NFR BLD AUTO: 6 % (ref 4–12)
NEUTROPHILS # BLD AUTO: 8.05 THOUSANDS/ÂΜL (ref 1.85–7.62)
NEUTS SEG NFR BLD AUTO: 67 % (ref 43–75)
NRBC BLD AUTO-RTO: 0 /100 WBCS
PLATELET # BLD AUTO: 200 THOUSANDS/UL (ref 149–390)
PMV BLD AUTO: 11.1 FL (ref 8.9–12.7)
RBC # BLD AUTO: 3.85 MILLION/UL (ref 3.81–5.12)
WBC # BLD AUTO: 12.1 THOUSAND/UL (ref 4.31–10.16)

## 2023-01-18 RX ORDER — ACETAMINOPHEN 325 MG/1
650 TABLET ORAL EVERY 4 HOURS PRN
Refills: 0
Start: 2023-01-18

## 2023-01-18 RX ORDER — IBUPROFEN 600 MG/1
600 TABLET ORAL EVERY 6 HOURS
Refills: 0
Start: 2023-01-18

## 2023-01-18 RX ADMIN — ACETAMINOPHEN 650 MG: 325 TABLET ORAL at 17:43

## 2023-01-18 RX ADMIN — IBUPROFEN 600 MG: 600 TABLET, FILM COATED ORAL at 15:45

## 2023-01-18 RX ADMIN — DOCUSATE SODIUM 100 MG: 100 CAPSULE, LIQUID FILLED ORAL at 08:33

## 2023-01-18 RX ADMIN — DOCUSATE SODIUM 100 MG: 100 CAPSULE, LIQUID FILLED ORAL at 17:43

## 2023-01-18 RX ADMIN — IBUPROFEN 600 MG: 600 TABLET, FILM COATED ORAL at 21:27

## 2023-01-18 RX ADMIN — ACETAMINOPHEN 650 MG: 325 TABLET ORAL at 21:27

## 2023-01-18 RX ADMIN — IBUPROFEN 600 MG: 600 TABLET, FILM COATED ORAL at 03:57

## 2023-01-18 RX ADMIN — IBUPROFEN 600 MG: 600 TABLET, FILM COATED ORAL at 10:06

## 2023-01-18 NOTE — PLAN OF CARE
Problem: POSTPARTUM  Goal: Experiences normal postpartum course  Description: INTERVENTIONS:  - Monitor maternal vital signs  - Assess uterine involution and lochia  Outcome: Progressing  Goal: Appropriate maternal -  bonding  Description: INTERVENTIONS:  - Identify family support  - Assess for appropriate maternal/infant bonding   -Encourage maternal/infant bonding opportunities  - Referral to  or  as needed  Outcome: Progressing  Goal: Establishment of infant feeding pattern  Description: INTERVENTIONS:  - Assess breast/bottle feeding  - Refer to lactation as needed  Outcome: Progressing  Goal: Incision(s), wounds(s) or drain site(s) healing without S/S of infection  Description: INTERVENTIONS  - Assess and document dressing, incision, wound bed, drain sites and surrounding tissue  - Provide patient and family education  Outcome: Progressing     Problem: PAIN - ADULT  Goal: Verbalizes/displays adequate comfort level or baseline comfort level  Description: Interventions:  - Encourage patient to monitor pain and request assistance  - Assess pain using appropriate pain scale  - Administer analgesics based on type and severity of pain and evaluate response  - Implement non-pharmacological measures as appropriate and evaluate response  - Consider cultural and social influences on pain and pain management  - Notify physician/advanced practitioner if interventions unsuccessful or patient reports new pain  Outcome: Progressing     Problem: INFECTION - ADULT  Goal: Absence or prevention of progression during hospitalization  Description: INTERVENTIONS:  - Assess and monitor for signs and symptoms of infection  - Monitor lab/diagnostic results  - Monitor all insertion sites, i e  indwelling lines, tubes, and drains  - Monitor endotracheal if appropriate and nasal secretions for changes in amount and color  - [place] appropriate cooling/warming therapies per order  - Administer medications as ordered  - Instruct and encourage patient and family to use good hand hygiene technique  - Identify and instruct in appropriate isolation precautions for identified infection/condition  Outcome: Progressing  Goal: Absence of fever/infection during neutropenic period  Description: INTERVENTIONS:  - Monitor WBC    Outcome: Progressing     Problem: SAFETY ADULT  Goal: Patient will remain free of falls  Description: INTERVENTIONS:  - Educate patient/family on patient safety including physical limitations  - Instruct patient to call for assistance with activity   - Consult OT/PT to assist with strengthening/mobility   - Keep Call bell within reach  - Keep bed low and locked with side rails adjusted as appropriate  - Keep care items and personal belongings within reach  - Initiate and maintain comfort rounds  - Make Fall Risk Sign visible to staff  - Apply yellow socks and bracelet for high fall risk patients  - Consider moving patient to room near nurses station  Outcome: Progressing  Goal: Maintain or return to baseline ADL function  Description: INTERVENTIONS:  -  Assess patient's ability to carry out ADLs; assess patient's baseline for ADL function and identify physical deficits which impact ability to perform ADLs (bathing, care of mouth/teeth, toileting, grooming, dressing, etc )  - Assess/evaluate cause of self-care deficits   - Assess range of motion  - Assess patient's mobility; develop plan if impaired  - Assess patient's need for assistive devices and provide as appropriate  - Encourage maximum independence but intervene and supervise when necessary  - Involve family in performance of ADLs  - Assess for home care needs following discharge   - Consider OT consult to assist with ADL evaluation and planning for discharge  - Provide patient education as appropriate  Outcome: Progressing  Goal: Maintains/Returns to pre admission functional level  Description: INTERVENTIONS:  - Perform BMAT or MOVE assessment daily    - Set and communicate daily mobility goal to care team and patient/family/caregiver     - Collaborate with rehabilitation services on mobility goals if consulted  - Out of bed for toileting  - Record patient progress and toleration of activity level   Outcome: Progressing     Problem: DISCHARGE PLANNING  Goal: Discharge to home or other facility with appropriate resources  Description: INTERVENTIONS:  - Identify barriers to discharge w/patient and caregiver  - Arrange for needed discharge resources and transportation as appropriate  - Identify discharge learning needs (meds, wound care, etc )  - Arrange for interpretive services to assist at discharge as needed  - Refer to Case Management Department for coordinating discharge planning if the patient needs post-hospital services based on physician/advanced practitioner order or complex needs related to functional status, cognitive ability, or social support system  Outcome: Progressing

## 2023-01-18 NOTE — PLAN OF CARE
Problem: POSTPARTUM  Goal: Experiences normal postpartum course  Description: INTERVENTIONS:  - Monitor maternal vital signs  - Assess uterine involution and lochia  Outcome: Progressing  Goal: Appropriate maternal -  bonding  Description: INTERVENTIONS:  - Identify family support  - Assess for appropriate maternal/infant bonding   -Encourage maternal/infant bonding opportunities  - Referral to  or  as needed  Outcome: Progressing  Goal: Establishment of infant feeding pattern  Description: INTERVENTIONS:  - Assess breast/bottle feeding  - Refer to lactation as needed  Outcome: Progressing  Goal: Incision(s), wounds(s) or drain site(s) healing without S/S of infection  Description: INTERVENTIONS  - Assess and document dressing, incision, wound bed, drain sites and surrounding tissue  - Provide patient and family education  Outcome: Progressing     Problem: PAIN - ADULT  Goal: Verbalizes/displays adequate comfort level or baseline comfort level  Description: Interventions:  - Encourage patient to monitor pain and request assistance  - Assess pain using appropriate pain scale  - Administer analgesics based on type and severity of pain and evaluate response  - Implement non-pharmacological measures as appropriate and evaluate response  - Consider cultural and social influences on pain and pain management  - Notify physician/advanced practitioner if interventions unsuccessful or patient reports new pain  Outcome: Progressing     Problem: INFECTION - ADULT  Goal: Absence or prevention of progression during hospitalization  Description: INTERVENTIONS:  - Assess and monitor for signs and symptoms of infection  - Monitor lab/diagnostic results  - Monitor all insertion sites, i e  indwelling lines, tubes, and drains  - Monitor endotracheal if appropriate and nasal secretions for changes in amount and color  - [place] appropriate cooling/warming therapies per order  - Administer medications as ordered  - Instruct and encourage patient and family to use good hand hygiene technique  - Identify and instruct in appropriate isolation precautions for identified infection/condition  Outcome: Progressing  Goal: Absence of fever/infection during neutropenic period  Description: INTERVENTIONS:  - Monitor WBC    Outcome: Progressing     Problem: SAFETY ADULT  Goal: Patient will remain free of falls  Description: INTERVENTIONS:  - Educate patient/family on patient safety including physical limitations  - Instruct patient to call for assistance with activity   - Consult OT/PT to assist with strengthening/mobility   - Keep Call bell within reach  - Keep bed low and locked with side rails adjusted as appropriate  - Keep care items and personal belongings within reach  - Initiate and maintain comfort rounds  - Make Fall Risk Sign visible to staff  - Apply yellow socks and bracelet for high fall risk patients  - Consider moving patient to room near nurses station  Outcome: Progressing  Goal: Maintain or return to baseline ADL function  Description: INTERVENTIONS:  -  Assess patient's ability to carry out ADLs; assess patient's baseline for ADL function and identify physical deficits which impact ability to perform ADLs (bathing, care of mouth/teeth, toileting, grooming, dressing, etc )  - Assess/evaluate cause of self-care deficits   - Assess range of motion  - Assess patient's mobility; develop plan if impaired  - Assess patient's need for assistive devices and provide as appropriate  - Encourage maximum independence but intervene and supervise when necessary  - Involve family in performance of ADLs  - Assess for home care needs following discharge   - Consider OT consult to assist with ADL evaluation and planning for discharge  - Provide patient education as appropriate  Outcome: Progressing  Goal: Maintains/Returns to pre admission functional level  Description: INTERVENTIONS:  - Perform BMAT or MOVE assessment daily    - Set and communicate daily mobility goal to care team and patient/family/caregiver     - Collaborate with rehabilitation services on mobility goals if consulted  - Out of bed for toileting  - Record patient progress and toleration of activity level   Outcome: Progressing     Problem: DISCHARGE PLANNING  Goal: Discharge to home or other facility with appropriate resources  Description: INTERVENTIONS:  - Identify barriers to discharge w/patient and caregiver  - Arrange for needed discharge resources and transportation as appropriate  - Identify discharge learning needs (meds, wound care, etc )  - Arrange for interpretive services to assist at discharge as needed  - Refer to Case Management Department for coordinating discharge planning if the patient needs post-hospital services based on physician/advanced practitioner order or complex needs related to functional status, cognitive ability, or social support system  Outcome: Progressing

## 2023-01-18 NOTE — DISCHARGE SUMMARY
Discharge Summary - OB/GYN   Margie Singh 23 y o  female MRN: [id number]  Unit/Bed#: -01 Encounter: [id number]    Admission Date: 1/15/2023     Discharge Date: 2023    Principal Diagnosis: induction of labor,  Pregnancy at 40w0d    Secondary Diagnosis: first degree laceration, gestational HTN    Attending - Delivery:  Bárbara Gonzales MD         - Discharge: Corey Billingsley MD    Procedures: Vaginal, Spontaneous , 1st degree laceration repair    Anesthesia: epidural    Complications: none apparent    Hospital Course:      Margie Singh is a 23 y o  [place] at 40w0d who was admitted for elective IOL  She delivered a viable male  with weight of 7lbs, 15 7oz, apgars of 8 (1 min) and 9 (5 min)   was transferred to  nursery  Patient tolerated the procedure well and was transferred to recovery in stable condition  Her postpartum course was complicated by some mildly elevated DBP on PPD#2  Labs normal and BP normalized  Dx with gestational HTN  Admission hemoglobin was 12 8g/dl, postpartum was 10 7g/dl  On day of discharge, she was ambulating and able to reasonably perform all ADLs  She was voiding and had appropriate bowel function  Pain was well controlled  She was discharged home on postpartum day #2 without complications  Patient was instructed to follow up with her OB as an outpatient and was given appropriate warnings to call provider if she develops signs of infection or uncontrolled pain  Reviewed gestational HTN and discussed preeclampsia precautions, return to office in 1 week for BP check  Condition at discharge: good     Discharge instructions/Information to patient and family:   See after visit summary for information provided to patient and family  Provisions for Follow-Up Care:  See after visit summary for information related to follow-up care and any pertinent home health orders        Disposition: Home    Planned Readmission: No    Discharge Medications: For a complete list of the patient's medications, please refer to her med rec      Jesus Ruvalcaba MD

## 2023-01-18 NOTE — LACTATION NOTE
This note was copied from a baby's chart  Met with parents to follow up and discuss the Breastfeeding Discharge Booklet  Mother discussed that she began supplementing with formula during the night  Reviewed cluster feeding and growth spurts  Baby is currently at a 4 5% weight loss, having appropriate output for his age and bilirubin is in the lower end of high intermediate that has been increasing and will get a repeat later today  Pump cycling was demonstrated and discussed in depth as mother would like to pump as well when baby supplements to protect her milk supply and provided expressed colostrum/breastmilk via bottle when mature milk comes in  Flexi-shields were provided for a better fit at the flange and hand pump usage was discussed and demonstrated as well as cleaning the pump parts  Showed the feeding log to could be continued using once home for up to the week and discussed the importance of ensuring that baby feeds 8-12x in 24 hours and that baby has 6-8 wet diapers as well as 3-4 soiled diapers (looking for stool transition from meconium to a yellow/gold seedy loose stool)  Mother given resources to look up medications to ensure they are safe with breastfeeding, by communicating with the [facility] as well as using [employer] (assisted mother to pin to home screen on personal phone)    Discussed engorgement time frame (when mature milk comes in) and management as well as how to deal with conditions that may occur while breastfeeding (plugged ducts, milk blebs and mastitis) and when is appropriate to communicate with her OB/GYN and/or a lactation consultant  Discussed how to set up a pump, how to cycle (stimulation vs expression phases during a pumping session), flange fit, milk storage and cleaning as mentioned above  Mother shown handouts for tips on pumping when returning to work and paced bottle feeding that was discussed and demonstrated      Mother shown community resources for continued support in breastfeeding once discharged home  She was encouraged to communicate with [address] for lactation home visits and/or with her baby's pediatrician for lactation support/services that could be offered in the practice  Parents were encouraged to call for further questions that arise prior to discharge

## 2023-01-18 NOTE — PROGRESS NOTES
Progress Note - OB/GYN  Post-Partum Physician Note   Radha Brooks 23 y o  female MRN: [id number]  Unit/Bed#:  208-01 Encounter: [id number]    Patient is postpartum day 2 from a Vaginal, Spontaneous  with a 1st degree laceration and Anesthesia: epidural    Subjective:   Pain: no  Tolerating Oral Intake: yes  Voiding: yes  Flatus: yes  Bowel Movement: no  Ambulating: yes  Breastfeeding: Bottle feeding  Shortness of Breath: no  Leg Pain/Discomfort: no  Lochia: normal      Objective:   Vitals:    01/17/23 1600 01/17/23 1915 01/17/23 2300 01/18/23 0700   BP: 133/87 128/98 134/70 121/84   BP Location: Left arm Right arm Left arm Left arm   Pulse: 102 97 80 77   Resp: 16 18 18 18   Temp: 98 °F (36 7 °C) 97 6 °F (36 4 °C) 97 7 °F (36 5 °C) (!) 97 2 °F (36 2 °C)   TempSrc: Oral Temporal Temporal Temporal   SpO2: 94% 95% 98% 100%   Weight:       Height:         No intake or output data in the 24 hours ending 01/18/23 1525    Physical Exam:  General: in no apparent distress  Abdomen: abdomen is soft without significant tenderness  Fundus: Firm, 1 below the umbilicus  Perineum: exam deferred  Lower extremities: nontender      Labs/Tests:   Lab Results   Component Value Date    WBC 12 10 (H) 01/18/2023    HGB 10 3 (L) 01/18/2023    HCT 32 2 (L) 01/18/2023    MCV 84 01/18/2023     01/18/2023       Brief OB Lab review:  ABO Grouping   Date Value Ref Range Status   01/15/2023 O  Final      Rh Factor   Date Value Ref Range Status   01/15/2023 Positive  Final    No results found for: ANTIBODYSCR  No results found for: RUBM    MEDS:   Current Facility-Administered Medications   Medication Dose Route Frequency   • acetaminophen (TYLENOL) tablet 650 mg  650 mg Oral Q4H PRN   • benzocaine-menthol-lanolin-aloe (DERMOPLAST) 20-0 5 % topical spray 1 application  1 application Topical F8G PRN   • docusate sodium (COLACE) capsule 100 mg  100 mg Oral BID   • hydrocortisone 1 % cream 1 application  1 application Topical Daily PRN   • ibuprofen (MOTRIN) tablet 600 mg  600 mg Oral Q6H   • oxyCODONE (ROXICODONE) IR tablet 5 mg  5 mg Oral Q4H PRN   • oxytocin (PITOCIN) 30 Units in lactated ringers 500 mL infusion  250 ruddy-units/min Intravenous Once   • oxytocin (PITOCIN) 30 Units in lactated ringers 500 mL infusion  62 5 ruddy-units/min Intravenous Continuous   • simethicone (MYLICON) chewable tablet 80 mg  80 mg Oral 4x Daily PRN   • witch hazel-glycerin (TUCKS) topical pad 1 pad  1 pad Topical Q4H PRN     Invasive Devices     None                 Assessment and Plan:  23y o  year-old , postpartum day 2 status-post  Vaginal, Spontaneous  and 1st degree laceration  Continue routine postpartum care  Encourage ambulation    Planning discharge today    Postpartum fever  - Likely secondary to intraamniotic infection/inflammation intrapartum, give onset of fever shortly following delivery  Patient is clinically and hemodynamically stable without evidence of endometritis, sepsis, or complicated infection  TMax 102 6 at 21:00 on   Afebrile since 21:45    - Given fever occurring following delivery, patient was started on Unasyn 3g IV q6h  Has 24hrs IV antibiotics and still afebrile  - Repeat CBC this AM is decreasing        Corey Billingsley MD

## 2023-01-19 VITALS
WEIGHT: 229.28 LBS | SYSTOLIC BLOOD PRESSURE: 127 MMHG | RESPIRATION RATE: 18 BRPM | OXYGEN SATURATION: 99 % | BODY MASS INDEX: 34.75 KG/M2 | TEMPERATURE: 98.2 F | DIASTOLIC BLOOD PRESSURE: 73 MMHG | HEART RATE: 89 BPM | HEIGHT: 68 IN

## 2023-01-19 LAB
ALBUMIN SERPL BCP-MCNC: 2.2 G/DL (ref 3.5–5)
ALP SERPL-CCNC: 196 U/L (ref 46–116)
ALT SERPL W P-5'-P-CCNC: 25 U/L (ref 12–78)
ANION GAP SERPL CALCULATED.3IONS-SCNC: 6 MMOL/L (ref 4–13)
AST SERPL W P-5'-P-CCNC: 29 U/L (ref 5–45)
BILIRUB SERPL-MCNC: 0.2 MG/DL (ref 0.2–1)
BUN SERPL-MCNC: 10 MG/DL (ref 5–25)
CALCIUM ALBUM COR SERPL-MCNC: 9.5 MG/DL (ref 8.3–10.1)
CALCIUM SERPL-MCNC: 8.1 MG/DL (ref 8.3–10.1)
CHLORIDE SERPL-SCNC: 108 MMOL/L (ref 96–108)
CO2 SERPL-SCNC: 25 MMOL/L (ref 21–32)
CREAT SERPL-MCNC: 0.69 MG/DL (ref 0.6–1.3)
ERYTHROCYTE [DISTWIDTH] IN BLOOD BY AUTOMATED COUNT: 14.5 % (ref 11.6–15.1)
GFR SERPL CREATININE-BSD FRML MDRD: 125 ML/MIN/1.73SQ M
GLUCOSE SERPL-MCNC: 102 MG/DL (ref 65–140)
HCT VFR BLD AUTO: 30.2 % (ref 34.8–46.1)
HGB BLD-MCNC: 9.7 G/DL (ref 11.5–15.4)
MCH RBC QN AUTO: 27.4 PG (ref 26.8–34.3)
MCHC RBC AUTO-ENTMCNC: 32.1 G/DL (ref 31.4–37.4)
MCV RBC AUTO: 85 FL (ref 82–98)
PLATELET # BLD AUTO: 200 THOUSANDS/UL (ref 149–390)
PMV BLD AUTO: 11 FL (ref 8.9–12.7)
POTASSIUM SERPL-SCNC: 3.9 MMOL/L (ref 3.5–5.3)
PROT SERPL-MCNC: 6.3 G/DL (ref 6.4–8.4)
RBC # BLD AUTO: 3.54 MILLION/UL (ref 3.81–5.12)
SODIUM SERPL-SCNC: 139 MMOL/L (ref 135–147)
URATE SERPL-MCNC: 4.9 MG/DL (ref 2–7.5)
WBC # BLD AUTO: 8.46 THOUSAND/UL (ref 4.31–10.16)

## 2023-01-19 RX ADMIN — DOCUSATE SODIUM 100 MG: 100 CAPSULE, LIQUID FILLED ORAL at 09:03

## 2023-01-19 RX ADMIN — IBUPROFEN 600 MG: 600 TABLET, FILM COATED ORAL at 10:33

## 2023-01-19 RX ADMIN — IBUPROFEN 600 MG: 600 TABLET, FILM COATED ORAL at 04:03

## 2023-01-19 NOTE — PROGRESS NOTES
Progress Note - OB/GYN  Post-Partum Physician Note   Anny Garcia 23 y o  female MRN: [id number]  Unit/Bed#:  208-01 Encounter: [id number]    Patient is postpartum day 3 from a Vaginal, Spontaneous  with a 1st degree laceration and Anesthesia: epidural    Subjective:   Pain: no  Tolerating Oral Intake: yes  Voiding: yes  Flatus: yes  Bowel Movement: no  Ambulating: yes  Breastfeeding: Bottle feeding  Shortness of Breath: no  Leg Pain/Discomfort: no  Lochia: normal      Objective:   Vitals:    01/18/23 0700 01/18/23 1545 01/18/23 2320 01/19/23 0900   BP: 121/84 135/94 135/94 127/73   BP Location: Left arm Left arm Left arm Left arm   Pulse: 77 84 83 89   Resp: 18 18 18 18   Temp: (!) 97 2 °F (36 2 °C) 97 9 °F (36 6 °C) 98 °F (36 7 °C) 98 2 °F (36 8 °C)   TempSrc: Temporal Temporal Temporal Temporal   SpO2: 100% 98% 99% 99%   Weight:       Height:         No intake or output data in the 24 hours ending 01/19/23 1102    Physical Exam:  General: in no apparent distress  Abdomen: abdomen is soft without significant tenderness  Fundus: Firm, 1 below the umbilicus  Perineum: exam deferred  Lower extremities: nontender      Labs/Tests:   Lab Results   Component Value Date    WBC 8 46 01/19/2023    HGB 9 7 (L) 01/19/2023    HCT 30 2 (L) 01/19/2023    MCV 85 01/19/2023     01/19/2023       Brief OB Lab review:  ABO Grouping   Date Value Ref Range Status   01/15/2023 O  Final      Rh Factor   Date Value Ref Range Status   01/15/2023 Positive  Final    No results found for: ANTIBODYSCR  No results found for: RUBM    MEDS:   Current Facility-Administered Medications   Medication Dose Route Frequency   • acetaminophen (TYLENOL) tablet 650 mg  650 mg Oral Q4H PRN   • benzocaine-menthol-lanolin-aloe (DERMOPLAST) 20-0 5 % topical spray 1 application  1 application Topical Z3X PRN   • docusate sodium (COLACE) capsule 100 mg  100 mg Oral BID   • hydrocortisone 1 % cream 1 application  1 application Topical Daily PRN • ibuprofen (MOTRIN) tablet 600 mg  600 mg Oral Q6H   • oxyCODONE (ROXICODONE) IR tablet 5 mg  5 mg Oral Q4H PRN   • oxytocin (PITOCIN) 30 Units in lactated ringers 500 mL infusion  250 ruddy-units/min Intravenous Once   • oxytocin (PITOCIN) 30 Units in lactated ringers 500 mL infusion  62 5 ruddy-units/min Intravenous Continuous   • simethicone (MYLICON) chewable tablet 80 mg  80 mg Oral 4x Daily PRN   • witch hazel-glycerin (TUCKS) topical pad 1 pad  1 pad Topical Q4H PRN     Invasive Devices     None                 Assessment and Plan:  23y o  year-old , postpartum day 3 status-post  Vaginal, Spontaneous  and 1st degree laceration  Continue routine postpartum care  Encourage ambulation    Planning discharge today    Postpartum fever  - Likely secondary to intraamniotic infection/inflammation intrapartum, give onset of fever shortly following delivery  Patient is clinically and hemodynamically stable without evidence of endometritis, sepsis, or complicated infection  TMax 102 6 at 21:00 on   Afebrile since 21:45    - Given fever occurring following delivery, patient was started on Unasyn 3g IV q6h  Has 24hrs IV antibiotics and still afebrile  - Repeat CBC this AM is decreasing  Postpartum hypertension  - yesterday patient with multiple DBP >90, greater than 4 hours apart  Kept for observation for elevated BP   - Labs normal and no symptoms of preeclampsia  - BP normal this morning   - reviewed preeclampsia precautions and recom pt return to office in 1 week for BP check        Tremayne Scott MD

## 2023-01-19 NOTE — ASSESSMENT & PLAN NOTE
- yesterday patient with multiple DBP >90, greater than 4 hours apart  Kept for observation for elevated BP   - Labs normal and no symptoms of preeclampsia  - BP normal this morning   - reviewed preeclampsia precautions and recom pt return to office in 1 week for BP check

## 2023-01-19 NOTE — DISCHARGE INSTR - AVS FIRST PAGE
HIGH BLOOD PRESSURES IN AND AROUND PREGNANCY    WHAT YOU NEED TO KNOW:   Hypertension is high blood pressure (BP)  Hypertension during pregnancy is a BP of 140/90 or higher  Severe hypertension is at least 160/110  One or both numbers of these readings may be high  Hypertension may start before you become pregnant, or develop during pregnancy  Pregnancy can cause high BP, or it may develop because of other risk factors you had before you became pregnant  It is important to get screened and treated for an elevated BP or hypertension during pregnancy  This can prevent problems for you and your baby  Some people with high blood pressure in pregnancy can develop Preeclampsia, which is a condition specific to pregnancy  Preeclampsia is high blood pressure (BP) that usually develops after week 20 of pregnancy  It can also develop days to weeks after delivery, even if you did not have high BP during pregnancy  When it develops after delivery, it may also be called postpartum preeclampsia  Preeclampsia causes your BP to be 140/90 or higher  You may also have protein in your urine or damage to organs such as your kidneys or liver  This is diagnosed with urine and blood testing  It can also lead to eclampsia, a condition that causes seizures from high BP  You have been diagnosed with gestational Hypertension  When should I call my doctor? You develop a severe headache that does not go away with Tylenol or rest      You have new or increased vision changes, such as blurred or spotted vision  You have severe abdominal pain with or without nausea and vomiting  You have shortness of breath or chest pain  If you have a blood pressure cuff at home, you can check your blood pressure daily - call your doctor if your blood pressure is higher than 160/110  To check blood pressure at home - Sit and rest for 5 minutes before you take your BP  Extend your arm and support it on a flat surface   Your arm should be at the same level as your heart  Follow the directions that came with your BP monitor  Take your BP as often as directed  Keep a record of your BP readings and bring it to your follow-up visits  You have questions or concerns about your condition or care

## 2023-01-19 NOTE — PLAN OF CARE
Problem: POSTPARTUM  Goal: Experiences normal postpartum course  Description: INTERVENTIONS:  - Monitor maternal vital signs  - Assess uterine involution and lochia  Outcome: Progressing  Goal: Appropriate maternal -  bonding  Description: INTERVENTIONS:  - Identify family support  - Assess for appropriate maternal/infant bonding   -Encourage maternal/infant bonding opportunities  - Referral to  or  as needed  Outcome: Progressing  Goal: Establishment of infant feeding pattern  Description: INTERVENTIONS:  - Assess breast/bottle feeding  - Refer to lactation as needed  Outcome: Progressing  Goal: Incision(s), wounds(s) or drain site(s) healing without S/S of infection  Description: INTERVENTIONS  - Assess and document dressing, incision, wound bed, drain sites and surrounding tissue  - Provide patient and family education  - Perform skin care/dressing changes every   Outcome: Progressing     Problem: PAIN - ADULT  Goal: Verbalizes/displays adequate comfort level or baseline comfort level  Description: Interventions:  - Encourage patient to monitor pain and request assistance  - Assess pain using appropriate pain scale  - Administer analgesics based on type and severity of pain and evaluate response  - Implement non-pharmacological measures as appropriate and evaluate response  - Consider cultural and social influences on pain and pain management  - Notify physician/advanced practitioner if interventions unsuccessful or patient reports new pain  Outcome: Progressing     Problem: INFECTION - ADULT  Goal: Absence or prevention of progression during hospitalization  Description: INTERVENTIONS:  - Assess and monitor for signs and symptoms of infection  - Monitor lab/diagnostic results  - Monitor all insertion sites, i e  indwelling lines, tubes, and drains  - Monitor endotracheal if appropriate and nasal secretions for changes in amount and color  - [place] appropriate cooling/warming therapies per order  - Administer medications as ordered  - Instruct and encourage patient and family to use good hand hygiene technique  - Identify and instruct in appropriate isolation precautions for identified infection/condition  Outcome: Progressing  Goal: Absence of fever/infection during neutropenic period  Description: INTERVENTIONS:  - Monitor WBC    Outcome: Progressing     Problem: SAFETY ADULT  Goal: Patient will remain free of falls  Description: INTERVENTIONS:  - Educate patient/family on patient safety including physical limitations  - Instruct patient to call for assistance with activity   - Consult OT/PT to assist with strengthening/mobility   - Keep Call bell within reach  - Keep bed low and locked with side rails adjusted as appropriate  - Keep care items and personal belongings within reach  - Initiate and maintain comfort rounds  - Make Fall Risk Sign visible to staff  - Offer Toileting every  Hours, in advance of need  - Initiate/Maintain alarm  - Obtain necessary fall risk management equipment:   - Apply yellow socks and bracelet for high fall risk patients  - Consider moving patient to room near nurses station  Outcome: Progressing  Goal: Maintain or return to baseline ADL function  Description: INTERVENTIONS:  -  Assess patient's ability to carry out ADLs; assess patient's baseline for ADL function and identify physical deficits which impact ability to perform ADLs (bathing, care of mouth/teeth, toileting, grooming, dressing, etc )  - Assess/evaluate cause of self-care deficits   - Assess range of motion  - Assess patient's mobility; develop plan if impaired  - Assess patient's need for assistive devices and provide as appropriate  - Encourage maximum independence but intervene and supervise when necessary  - Involve family in performance of ADLs  - Assess for home care needs following discharge   - Consider OT consult to assist with ADL evaluation and planning for discharge  - Provide patient education as appropriate  Outcome: Progressing  Goal: Maintains/Returns to pre admission functional level  Description: INTERVENTIONS:  - Perform BMAT or MOVE assessment daily    - Set and communicate daily mobility goal to care team and patient/family/caregiver  - Collaborate with rehabilitation services on mobility goals if consulted  - Perform Range of Motion  times a day  - Reposition patient every  hours    - Dangle patient  times a day  - Stand patient  times a day  - Ambulate patient  times a day  - Out of bed to chair  times a day   - Out of bed for meals times a day  - Out of bed for toileting  - Record patient progress and toleration of activity level   Outcome: Progressing     Problem: DISCHARGE PLANNING  Goal: Discharge to home or other facility with appropriate resources  Description: INTERVENTIONS:  - Identify barriers to discharge w/patient and caregiver  - Arrange for needed discharge resources and transportation as appropriate  - Identify discharge learning needs (meds, wound care, etc )  - Arrange for interpretive services to assist at discharge as needed  - Refer to Case Management Department for coordinating discharge planning if the patient needs post-hospital services based on physician/advanced practitioner order or complex needs related to functional status, cognitive ability, or social support system  Outcome: Progressing

## 2023-01-19 NOTE — PLAN OF CARE
Problem: POSTPARTUM  Goal: Experiences normal postpartum course  Description: INTERVENTIONS:  - Monitor maternal vital signs  - Assess uterine involution and lochia  Outcome: Adequate for Discharge  Goal: Appropriate maternal -  bonding  Description: INTERVENTIONS:  - Identify family support  - Assess for appropriate maternal/infant bonding   -Encourage maternal/infant bonding opportunities  - Referral to  or  as needed  Outcome: Adequate for Discharge  Goal: Establishment of infant feeding pattern  Description: INTERVENTIONS:  - Assess breast/bottle feeding  - Refer to lactation as needed  Outcome: Adequate for Discharge  Goal: Incision(s), wounds(s) or drain site(s) healing without S/S of infection  Description: INTERVENTIONS  - Assess and document dressing, incision, wound bed, drain sites and surrounding tissue  - Provide patient and family education  Outcome: Adequate for Discharge     Problem: PAIN - ADULT  Goal: Verbalizes/displays adequate comfort level or baseline comfort level  Description: Interventions:  - Encourage patient to monitor pain and request assistance  - Assess pain using appropriate pain scale  - Administer analgesics based on type and severity of pain and evaluate response  - Implement non-pharmacological measures as appropriate and evaluate response  - Consider cultural and social influences on pain and pain management  - Notify physician/advanced practitioner if interventions unsuccessful or patient reports new pain  Outcome: Adequate for Discharge     Problem: INFECTION - ADULT  Goal: Absence or prevention of progression during hospitalization  Description: INTERVENTIONS:  - Assess and monitor for signs and symptoms of infection  - Monitor lab/diagnostic results  - Monitor all insertion sites, i e  indwelling lines, tubes, and drains  - Monitor endotracheal if appropriate and nasal secretions for changes in amount and color  - [place] appropriate cooling/warming therapies per order  - Administer medications as ordered  - Instruct and encourage patient and family to use good hand hygiene technique  - Identify and instruct in appropriate isolation precautions for identified infection/condition  Outcome: Adequate for Discharge  Goal: Absence of fever/infection during neutropenic period  Description: INTERVENTIONS:  - Monitor WBC    Outcome: Adequate for Discharge     Problem: SAFETY ADULT  Goal: Patient will remain free of falls  Description: INTERVENTIONS:  - Educate patient/family on patient safety including physical limitations  - Instruct patient to call for assistance with activity   - Consult OT/PT to assist with strengthening/mobility   - Keep Call bell within reach  - Keep bed low and locked with side rails adjusted as appropriate  - Keep care items and personal belongings within reach  - Initiate and maintain comfort rounds  - Make Fall Risk Sign visible to staff    - Apply yellow socks and bracelet for high fall risk patients  - Consider moving patient to room near nurses station  Outcome: Adequate for Discharge  Goal: Maintain or return to baseline ADL function  Description: INTERVENTIONS:  -  Assess patient's ability to carry out ADLs; assess patient's baseline for ADL function and identify physical deficits which impact ability to perform ADLs (bathing, care of mouth/teeth, toileting, grooming, dressing, etc )  - Assess/evaluate cause of self-care deficits   - Assess range of motion  - Assess patient's mobility; develop plan if impaired  - Assess patient's need for assistive devices and provide as appropriate  - Encourage maximum independence but intervene and supervise when necessary  - Involve family in performance of ADLs  - Assess for home care needs following discharge   - Consider OT consult to assist with ADL evaluation and planning for discharge  - Provide patient education as appropriate  Outcome: Adequate for Discharge  Goal: Maintains/Returns to pre admission functional level  Description: INTERVENTIONS:  - Perform BMAT or MOVE assessment daily    - Set and communicate daily mobility goal to care team and patient/family/caregiver     - Collaborate with rehabilitation services on mobility goals if consulted    - Out of bed for toileting  - Record patient progress and toleration of activity level   Outcome: Adequate for Discharge     Problem: DISCHARGE PLANNING  Goal: Discharge to home or other facility with appropriate resources  Description: INTERVENTIONS:  - Identify barriers to discharge w/patient and caregiver  - Arrange for needed discharge resources and transportation as appropriate  - Identify discharge learning needs (meds, wound care, etc )  - Arrange for interpretive services to assist at discharge as needed  - Refer to Case Management Department for coordinating discharge planning if the patient needs post-hospital services based on physician/advanced practitioner order or complex needs related to functional status, cognitive ability, or social support system  Outcome: Adequate for Discharge

## 2023-01-20 ENCOUNTER — TELEPHONE (OUTPATIENT)
Dept: PEDIATRICS CLINIC | Facility: CLINIC | Age: 23
End: 2023-01-20

## 2023-01-20 NOTE — TELEPHONE ENCOUNTER
Patient delivered on 1/16/2023 and baby is currently being seen at [facility] with Randy Olmos

## 2023-02-21 ENCOUNTER — POSTPARTUM VISIT (OUTPATIENT)
Dept: OBGYN CLINIC | Facility: CLINIC | Age: 23
End: 2023-02-21

## 2023-02-21 VITALS
DIASTOLIC BLOOD PRESSURE: 70 MMHG | SYSTOLIC BLOOD PRESSURE: 120 MMHG | HEIGHT: 68 IN | BODY MASS INDEX: 33.04 KG/M2 | WEIGHT: 218 LBS

## 2023-02-21 DIAGNOSIS — Z30.430 VISIT FOR INSERTION OF INTRAUTERINE DEVICE: ICD-10-CM

## 2023-02-21 PROBLEM — Z36.85 ANTENATAL SCREENING FOR STREPTOCOCCUS B: Status: RESOLVED | Noted: 2022-12-22 | Resolved: 2023-02-21

## 2023-02-21 PROBLEM — Z3A.39 39 WEEKS GESTATION OF PREGNANCY: Status: RESOLVED | Noted: 2022-12-08 | Resolved: 2023-02-21

## 2023-02-21 PROBLEM — Z3A.30 30 WEEKS GESTATION OF PREGNANCY: Status: RESOLVED | Noted: 2022-09-09 | Resolved: 2023-02-21

## 2023-02-21 PROBLEM — Z34.03 FIRST PREGNANCY, THIRD TRIMESTER: Status: RESOLVED | Noted: 2022-12-22 | Resolved: 2023-02-21

## 2023-02-21 PROBLEM — Z23 NEED FOR DIPHTHERIA-TETANUS-PERTUSSIS (TDAP) VACCINE: Status: RESOLVED | Noted: 2022-11-21 | Resolved: 2023-02-21

## 2023-02-21 PROBLEM — Z3A.36 36 WEEKS GESTATION OF PREGNANCY: Status: RESOLVED | Noted: 2022-12-22 | Resolved: 2023-02-21

## 2023-02-21 PROBLEM — Z34.90 ENCOUNTER FOR ELECTIVE INDUCTION OF LABOR: Status: RESOLVED | Noted: 2023-01-15 | Resolved: 2023-02-21

## 2023-02-21 NOTE — PROGRESS NOTES
[address], [address], [facility], [address]    Assessment/Plan:  Kwaku Tapia is a 23y o  year old  who presents for postpartum visit  Routine Postpartum Care  1  Normal postpartum exam  2  Contraception: IUD - Mirena placed today  See separate note  3  Depression Screen: 7 (mild)   4  Feeding: Breast with occasional supplementation  5  Psychosocial support: good  6  Patient Education: "[employer]: [employer]"  7  Cervical cancer screening Up to Date, next due @ 27 y/o  8  Follow up in: 3 months or as needed  Additional Problems:  1  Routine postpartum follow-up    2  Visit for insertion of intrauterine device  -     levonorgestrel (MIRENA) IUD 20 mcg/day  -     Iud insertions          Subjective:     CC: Postpartum visit    Yovana Saldana is a 23 y o  y o  female  who presents for a postpartum visit  She is 5 weeks postpartum following a spontaneous vaginal delivery on 23 at 40 0 weeks  Outcome: spontaneous vaginal delivery  Anesthesia: epidural  Postpartum course has been unremarkable  Baby's course has been unremarkable  Baby is feeding by breast with occasional formula supplementation  Bleeding clear  Bowel function is normal  Bladder function is normal  Patient is not sexually active  Contraception method is IUD  Postpartum depression screenin (mild)      The following portions of the patient's history were reviewed and updated as appropriate: allergies, current medications, past family history, past medical history, obstetric history, gynecologic history, past social history, past surgical history and problem list       Objective:  /70 (BP Location: Left arm, Patient Position: Sitting, Cuff Size: Standard)   Ht 5' 7 5" (1 715 m)   Wt 98 9 kg (218 lb)   LMP  (LMP Unknown)   Breastfeeding Yes   BMI 33 64 kg/m²   Pregravid Weight/BMI: Pregravid weight not on file (BMI Could not be calculated)  Current Weight: 98 9 kg (218 lb)   Total Weight Gain: Not found  Pre-[place] Vitals    Flowsheet Row Most Recent Value   Prenatal Assessment    Prenatal Vitals    Blood Pressure 120/70   Weight - Scale 98 9 kg (218 lb)   Urine Albumin/Glucose    Dilation/Effacement/Station    Vaginal Drainage    Edema            General: Well appearing, no distress  Mood and affect: Appropriate  Abdomen: Soft, nontender  Thyroid: No masses  Incision: n/a  Vulva: Well healed  Vagina: Well healed  No lesions  Urethra: Normal  Cervix: Healed, no lesions  Uterus: Normal size, no masses  Adnexa: No pain or masses  Extremities: Warm and well perfused  Non tender      Iud insertions    Date/Time: 2023 1:45 PM  Performed by: Awais Whittaker MD  Authorized by: Awais Whittaker MD     Risks and benefits: Risks, benefits and alternatives were discussed    Consent given by:  Patient  Time Out:     Time out: Immediately prior to the procedure a time out was called    Patient states understanding of procedure being performed: Yes    Patient identity confirmed:  Verbally with patient  Procedure:     Pelvic exam performed: yes      Cervix cleaned and prepped: yes      Speculum placed in vagina: yes      Tenaculum applied to cervix: yes      Uterus sounded: yes      Uterus sound depth (cm):  8    IUD inserted with no complications: yes      IUD type:  Mirena    Strings trimmed: yes    Post-procedure:     Patient tolerated procedure well: yes

## 2023-03-06 ENCOUNTER — TELEPHONE (OUTPATIENT)
Dept: OBGYN CLINIC | Facility: CLINIC | Age: 23
End: 2023-03-06

## 2023-03-06 NOTE — TELEPHONE ENCOUNTER
Margie is having irregular bleeding despite breast feeding & having Mirena inserted  She said bleeding can be heavy at times  Advised her to take Ibuprofen 600 mg every 6 hours  Call back,if bleeding gets heavier or bleeding is more than 10 days

## 2023-03-07 NOTE — TELEPHONE ENCOUNTER
Margie called concerned about persistant vaginal bleeding post IUD insert 2/21/2023  Has had moderate to heavy bleeding for at least 7 days  Today changing overnite pad she thinks about every hour  Started on ibuprofen 600mg TID yesterday with no change  Offered appt for tonight at 5:40  Patient declined, requesting appt for tomorrow  Provided appt as requested  Encouraged continue with ibuprofen 600TID if soaks overnite pad < 1 hour x 2 recommend evaluation in ER   patiient verbalized understanding and agreement to plan

## 2023-03-08 ENCOUNTER — OFFICE VISIT (OUTPATIENT)
Dept: OBGYN CLINIC | Facility: CLINIC | Age: 23
End: 2023-03-08

## 2023-03-08 VITALS
HEIGHT: 67 IN | SYSTOLIC BLOOD PRESSURE: 118 MMHG | WEIGHT: 220.2 LBS | DIASTOLIC BLOOD PRESSURE: 80 MMHG | BODY MASS INDEX: 34.56 KG/M2

## 2023-03-08 DIAGNOSIS — Z30.431 IUD CHECK UP: ICD-10-CM

## 2023-03-08 DIAGNOSIS — R30.0 DYSURIA: ICD-10-CM

## 2023-03-08 DIAGNOSIS — N92.6 IRREGULAR BLEEDING: Primary | ICD-10-CM

## 2023-03-08 NOTE — PROGRESS NOTES
Assessment/Plan:      Diagnoses and all orders for this visit:    Irregular bleeding     -  Reassured patient that irregular intermenstrual spotting within 1 month of IUD insert is not abnormal given the IUD string is visible in os  Advised to keep menstrual calender and inform Ob/Gyn if bleeding <21 days apart or lasting >6 days of menstrual flow type of bleeding   -  Pelvic U/S ordered to further evaluate IUD position in uterus    IUD check up  -  Informed patient of IUD string bening visible in os  Pelvic U/S ordered to further evaluate  -     US pelvis complete w transvaginal; Future    Dysuria  -     Urine culture; Future  -     Urine culture      Subjective:     Patient ID: Harika Jaimes is a 23 y o  female  Patient presents with concern about vaginal bleeding that started 8 days ago  Patient had Mirena inserted on 2/21/23 at 5 weeks postpartum  Has not had any vaginal bleeding until 8 days ago  Patient states bleeding started as period type of bleeding and decreased to light after a couple of days and then restarted as period type of bleeding  Patient breastfeeding during the day and bottle feeding at night  Denies having pelvic pain or dysmenorrhea  Took Motrin 600 mg yesterday but has not taken any meds since then  Patient also with intermittent discomfort with voiding, denies having increased frequency and persistent dysuria      Review of Systems   Constitutional: Negative for activity change and unexpected weight change  Genitourinary: Positive for vaginal bleeding  Negative for dyspareunia, frequency, pelvic pain, vaginal discharge and vaginal pain  Objective:     Physical Exam  Vitals and nursing note reviewed  HENT:      Head: Normocephalic  Abdominal:      General: There is no distension  Palpations: Abdomen is soft  There is no mass  Tenderness: There is no abdominal tenderness  There is no rebound  Genitourinary:     General: Normal vulva        Exam position: Lithotomy position  Labia:         Right: No rash, tenderness or lesion  Left: No rash, tenderness or lesion  Urethra: No urethral pain or urethral lesion  Vagina: Normal  No vaginal discharge  Cervix: No cervical motion tenderness, lesion or erythema  Uterus: Normal        Adnexa: Right adnexa normal and left adnexa normal       Rectum: No external hemorrhoid  Comments: IUD string visible in os  Dark blood noted in vaginal vault and active dark red color bleeding noted from cervical os, moderate flow, no clots  Musculoskeletal:      Right lower leg: No edema  Left lower leg: No edema  Skin:     General: Skin is warm  Neurological:      Mental Status: She is alert and oriented to person, place, and time  Psychiatric:         Mood and Affect: Mood normal          Behavior: Behavior normal          Thought Content:  Thought content normal

## 2024-11-13 ENCOUNTER — OFFICE VISIT (OUTPATIENT)
Dept: OBGYN CLINIC | Facility: CLINIC | Age: 24
End: 2024-11-13
Payer: COMMERCIAL

## 2024-11-13 VITALS
SYSTOLIC BLOOD PRESSURE: 122 MMHG | DIASTOLIC BLOOD PRESSURE: 72 MMHG | WEIGHT: 256 LBS | HEIGHT: 68 IN | BODY MASS INDEX: 38.8 KG/M2

## 2024-11-13 DIAGNOSIS — N89.8 VAGINAL ODOR: Primary | ICD-10-CM

## 2024-11-13 DIAGNOSIS — B96.89 BACTERIAL VAGINOSIS: ICD-10-CM

## 2024-11-13 DIAGNOSIS — N76.0 BACTERIAL VAGINOSIS: ICD-10-CM

## 2024-11-13 DIAGNOSIS — Z30.431 IUD CHECK UP: ICD-10-CM

## 2024-11-13 PROCEDURE — 99214 OFFICE O/P EST MOD 30 MIN: CPT | Performed by: OBSTETRICS & GYNECOLOGY

## 2024-11-13 NOTE — PROGRESS NOTES
"Assessment/Plan:      Diagnoses and all orders for this visit:    Vaginal odor    -  informed patient of no malodor or abnormal discharge noted on exam.    -  Wet mount negative for Hyphae, clue cells and motility      IUD check up      -  informed patient IUD string visible in cervical os.  Reassured patient that the direction of IUD string can change and is not a cause for concern    Bacterial vaginosis     -  informed patient pelvic exam, Wiff test and Wet mount negative for B.V.       Other orders  -     Levonorgestrel (MIRENA) 20 MCG/DAY IUD; 1 Intra Uterine Device by Intrauterine route      Subjective:     Patient ID: Margie Gallardo is a 24 y.o. female.    Pt states she is concerned she has \"BV\" after \"looking stuff up on internet\".  Patient also concerned her IUD string has curved instead of being straight as it had been in the past        Review of Systems   Constitutional:  Negative for activity change, chills, fever and unexpected weight change.   Genitourinary:  Negative for dyspareunia, dysuria, genital sores, pelvic pain, vaginal bleeding, vaginal discharge and vaginal pain.         Objective:     Physical Exam  Vitals and nursing note reviewed.   HENT:      Head: Normocephalic.   Abdominal:      General: There is no distension.      Palpations: Abdomen is soft. There is no mass.      Tenderness: There is no abdominal tenderness. There is no rebound.   Genitourinary:     General: Normal vulva.      Exam position: Lithotomy position.      Labia:         Right: No rash, tenderness or lesion.         Left: No rash, tenderness or lesion.       Urethra: No urethral pain or urethral lesion.      Vagina: Normal. No vaginal discharge.      Cervix: No cervical motion tenderness, lesion or erythema.      Uterus: Normal.       Adnexa: Right adnexa normal and left adnexa normal.      Rectum: No external hemorrhoid.      Comments: IUD string visible in the os.  Musculoskeletal:      Right lower leg: No edema. "      Left lower leg: No edema.   Skin:     General: Skin is warm.   Neurological:      Mental Status: She is alert and oriented to person, place, and time.   Psychiatric:         Mood and Affect: Mood normal.         Behavior: Behavior normal.         Thought Content: Thought content normal.